# Patient Record
Sex: MALE | Race: WHITE | HISPANIC OR LATINO | Employment: FULL TIME | URBAN - METROPOLITAN AREA
[De-identification: names, ages, dates, MRNs, and addresses within clinical notes are randomized per-mention and may not be internally consistent; named-entity substitution may affect disease eponyms.]

---

## 2020-11-27 ENCOUNTER — CLINICAL SUPPORT (OUTPATIENT)
Dept: URGENT CARE | Facility: CLINIC | Age: 45
End: 2020-11-27
Payer: COMMERCIAL

## 2020-11-27 DIAGNOSIS — Z11.9 ENCOUNTER FOR SCREENING EXAMINATION FOR INFECTIOUS DISEASE: Primary | ICD-10-CM

## 2020-11-27 LAB
CTP QC/QA: YES
SARS-COV-2 RDRP RESP QL NAA+PROBE: NEGATIVE

## 2020-11-27 PROCEDURE — U0002: ICD-10-PCS | Mod: QW,S$GLB,, | Performed by: INTERNAL MEDICINE

## 2020-11-27 PROCEDURE — U0002 COVID-19 LAB TEST NON-CDC: HCPCS | Mod: QW,S$GLB,, | Performed by: INTERNAL MEDICINE

## 2020-11-27 NOTE — LETTER
[address] [facility] [address], [place] [address]  Phone: [phone]  Fax: [phone]          Return to Work/School    Patient: Anderson Bell  YOB: 1975   Date: 11/27/2020     To Whom It May Concern:     Anderson Bell was in contact with/seen in my office on 11/27/2020. COVID-19 is present in our communities across the state. There is limited testing for COVID at this time, so not all patients can be tested. In this situation, your employee meets the following criteria:     Anderson Bell has met the criteria for COVID-19 testing and has a NEGATIVE result. The employee can return to work once they are asymptomatic for 24 hours without the use of fever reducing medications (Tylenol, Motrin, etc).     If you have any questions or concerns, or if I can be of further assistance, please do not hesitate to contact me.     Sincerely,    NURSE URGENT CARE, [facility]

## 2020-11-27 NOTE — PATIENT INSTRUCTIONS
"Your test was NEGATIVE for COVID-19 (coronavirus).      You may leave home and/or return to work when the following conditions are met:   24 hours fever free without fever-reducing medications AND   Improved symptoms   You have not met the conditions of a closed exposure       A "close exposure" is defined as anyone who has had an exposure (masked or unmasked) to a known COVID -19 positive person within 6 ft for longer than 15 minutes. If your exposure meets this definition you are required by CDC guidelines to quarantine for 14 days from time of exposure regardless of test status.      Additional instructions:  · Social distance per your local guidelines  · Call ahead before visiting your doctor.  · Wear a facemask when around others who do not live in your household.  · Cover your coughs and sneezes.  · Wash your hands often with soap and water; hand  can be used, too.      If your symptoms worsen or if you have any other concerns, please contact [facility] at [phone].     Sincerely,    BRETT LEWIS RT    "

## 2024-03-06 ENCOUNTER — PATIENT MESSAGE (OUTPATIENT)
Dept: SPORTS MEDICINE | Facility: CLINIC | Age: 49
End: 2024-03-06

## 2024-03-06 ENCOUNTER — OFFICE VISIT (OUTPATIENT)
Dept: SPORTS MEDICINE | Facility: CLINIC | Age: 49
End: 2024-03-06
Payer: COMMERCIAL

## 2024-03-06 ENCOUNTER — TELEPHONE (OUTPATIENT)
Dept: SPORTS MEDICINE | Facility: CLINIC | Age: 49
End: 2024-03-06
Payer: COMMERCIAL

## 2024-03-06 VITALS
DIASTOLIC BLOOD PRESSURE: 81 MMHG | HEART RATE: 89 BPM | SYSTOLIC BLOOD PRESSURE: 124 MMHG | HEIGHT: 66 IN | BODY MASS INDEX: 37.61 KG/M2 | WEIGHT: 234 LBS

## 2024-03-06 DIAGNOSIS — M62.830 SPASM OF THORACIC BACK MUSCLE: Primary | ICD-10-CM

## 2024-03-06 PROCEDURE — 1159F MED LIST DOCD IN RCRD: CPT | Mod: CPTII,S$GLB,, | Performed by: PHYSICIAN ASSISTANT

## 2024-03-06 PROCEDURE — 3079F DIAST BP 80-89 MM HG: CPT | Mod: CPTII,S$GLB,, | Performed by: PHYSICIAN ASSISTANT

## 2024-03-06 PROCEDURE — 1160F RVW MEDS BY RX/DR IN RCRD: CPT | Mod: CPTII,S$GLB,, | Performed by: PHYSICIAN ASSISTANT

## 2024-03-06 PROCEDURE — 3074F SYST BP LT 130 MM HG: CPT | Mod: CPTII,S$GLB,, | Performed by: PHYSICIAN ASSISTANT

## 2024-03-06 PROCEDURE — 3008F BODY MASS INDEX DOCD: CPT | Mod: CPTII,S$GLB,, | Performed by: PHYSICIAN ASSISTANT

## 2024-03-06 PROCEDURE — 99203 OFFICE O/P NEW LOW 30 MIN: CPT | Mod: S$GLB,,, | Performed by: PHYSICIAN ASSISTANT

## 2024-03-06 PROCEDURE — 99999 PR PBB SHADOW E&M-EST. PATIENT-LVL III: CPT | Mod: PBBFAC,,, | Performed by: PHYSICIAN ASSISTANT

## 2024-03-06 RX ORDER — METHYLPREDNISOLONE 4 MG/1
TABLET ORAL
Qty: 21 EACH | Refills: 0 | Status: SHIPPED | OUTPATIENT
Start: 2024-03-06 | End: 2024-03-06

## 2024-03-06 RX ORDER — METHYLPREDNISOLONE 4 MG/1
TABLET ORAL
Qty: 21 EACH | Refills: 0 | Status: SHIPPED | OUTPATIENT
Start: 2024-03-06 | End: 2024-03-27

## 2024-03-06 RX ORDER — METHOCARBAMOL 1000 MG/1
1000 TABLET, COATED ORAL 3 TIMES DAILY PRN
Qty: 30 TABLET | Refills: 0 | Status: SHIPPED | OUTPATIENT
Start: 2024-03-06 | End: 2024-03-06

## 2024-03-06 RX ORDER — METHOCARBAMOL 500 MG/1
1000 TABLET, FILM COATED ORAL 3 TIMES DAILY PRN
Qty: 60 TABLET | Refills: 0 | Status: SHIPPED | OUTPATIENT
Start: 2024-03-06

## 2024-03-06 NOTE — PROGRESS NOTES
NEW PATIENT    HISTORY OF PRESENT ILLNESS   48 y.o. Male with a history of mid-back / right flank pain who began having discomfort 4 days ago.  He denies having any precipitating injury or trauma.  He has no pain with breathing, coughing, or sneezing.  He does not recall feeling a pop.  He has had some spasming of the musculature under his right shoulder blade.  He denies having any paresthesias.        - mechanical symptoms, - instability    Is affecting ADLs.  Pain is 10/10 at it's worst.        PAST MEDICAL HISTORY    History reviewed. No pertinent past medical history.    PAST SURGICAL HISTORY     History reviewed. No pertinent surgical history.    FAMILY HISTORY    History reviewed. No pertinent family history.    SOCIAL HISTORY    Social History     Socioeconomic History    Marital status:      Social Determinants of Health     Financial Resource Strain: Low Risk  (3/6/2024)    Overall Financial Resource Strain (CARDIA)     Difficulty of Paying Living Expenses: Not hard at all   Food Insecurity: Unknown (3/6/2024)    Hunger Vital Sign     Worried About Running Out of Food in the Last Year: Never true     Ran Out of Food in the Last Year: Patient declined   Transportation Needs: No Transportation Needs (3/6/2024)    PRAPARE - Transportation     Lack of Transportation (Medical): No     Lack of Transportation (Non-Medical): No   Physical Activity: Sufficiently Active (3/6/2024)    Exercise Vital Sign     Days of Exercise per Week: 5 days     Minutes of Exercise per Session: 50 min   Stress: No Stress Concern Present (3/6/2024)    [employer] - Occupational Stress Questionnaire     Feeling of Stress : Not at all   Social Connections: Unknown (3/6/2024)    Social Connection and Isolation Panel [NHANES]     Frequency of Communication with Friends and Family: Twice a week     Frequency of Social Gatherings with Friends and Family: Three times a week     Active Member of Clubs or  "Organizations: Yes     Attends Club or Organization Meetings: More than 4 times per year     Marital Status:    Housing Stability: High Risk (3/6/2024)    Housing Stability Vital Sign     Unable to Pay for Housing in the Last Year: Patient declined     Unstable Housing in the Last Year: Yes       MEDICATIONS      Current Outpatient Medications:     methocarbamoL (ROBAXIN) 500 MG Tab, Take 2 tablets (1,000 mg total) by mouth 3 (three) times daily as needed (Pain/Spasm)., Disp: 60 tablet, Rfl: 0    methylPREDNISolone (MEDROL DOSEPACK) 4 mg tablet, use as directed on package, Disp: 21 each, Rfl: 0    ALLERGIES     Review of patient's allergies indicates:  No Known Allergies      REVIEW OF SYSTEMS   Constitution: Negative. Negative for chills, fever and night sweats.   HENT: Negative for congestion and headaches.    Eyes: Negative for blurred vision, left vision loss and right vision loss.   Cardiovascular: Negative for chest pain and syncope.   Respiratory: Negative for cough and shortness of breath.    Endocrine: Negative for polydipsia, polyphagia and polyuria.   Hematologic/Lymphatic: Negative for bleeding problem. Does not bruise/bleed easily.   Skin: Negative for dry skin, itching and rash.   Musculoskeletal: Negative for falls. Positive for right flank pain.  Gastrointestinal: Negative for abdominal pain and bowel incontinence.   Genitourinary: Negative for bladder incontinence and nocturia.   Neurological: Negative for disturbances in coordination, loss of balance and seizures.   Psychiatric/Behavioral: Negative for depression. The patient does not have insomnia.    Allergic/Immunologic: Negative for hives and persistent infections.     PHYSICAL EXAMINATION    Vitals: /81   Pulse 89   Ht 5' 6" (1.676 m)   Wt 106.1 kg (234 lb 0.3 oz)   BMI 37.77 kg/m²     General: The patient appears active and healthy with no apparent physical problems.  No disturbance of mood or affect is demonstrated. Alert " and Oriented.    HEENT: Eyes normal, pupils equally round, nose normal.    Resp: Equal and symmetrical chest rises. No wheezing    CV: Regular rate    Neck: Supple; nonpainful range of motion.    Extremities: no cyanosis, clubbing, edema, or diffuse swelling.  Palpable pulses, good capillary refill of the digits.  No coolness, discoloration, edema or obvious varicosities.    Skin: no lesions noted.    Lymphatic: no detected adenopathy in the upper or lower extremities.    Neurologic: normal mental status, normal reflexes, normal gait and balance.  Patient is alert and oriented to person, place and time.  No flaccidity or spasticity is noted.  No motor or sensory deficits are noted.  Light touch is intact    Orthopaedic: SHOULDER EXAM - RIGHT    Inspection:   Normal skin color and appearance with no scars.  No muscle atrophy noted.  No scapular winging.      Palpation: No tenderness of the acromioclavicular joint, lateral edge of the acromion, biceps tendon, trapezius muscle or scapulothoracic bursa.      ROM:      PROM:     FE - 180°    Abd/ER -  90°  Abd/IR -  45°   Add/ER -  60°     AROM:    FE - 180°    Abd/ER -  90°  Abd/IR -  45°   Add/ER -  60°         Tests:     - Gonzalez, - Neer's, - Cross Arm Adduction, - [place], - [place]erguson, - Speed. - Belly Press,  - Jobes, - Lift Off    Stability: - sulcus, - apprehension, - relocation, - load and shift, - DLS      Motor:  Rotator cuff strength is 5/5 supraspinatus, 5/5 infraspinatus, 5/5 subscapularis. Biceps, triceps and deltoid strength is 5/5.      Neuro     Distally there are no paresthesias, and sensation is intact to light touch in the median, ulnar, and radial distributions.  Reflexes are 2/2 biceps, triceps and brachioradialis.          IMPRESSION       ICD-10-CM ICD-9-CM   1. Spasm of thoracic back muscle  M62.830 724.8       MEDICATIONS PRESCRIBED      Medrol dosepack  Robaxin 1,000 mg    RECOMMENDATIONS     History and physical examination are consistent with  an acute muscle spasm  Activity modifications were given  Encouraged rest from physical activities and the use of a heating pad as needed  Medrol Dosepak taken as directed  Robaxin 1000 mg by mouth 3 times daily as needed for pain/muscle spasms  Evaluation by physical therapist for modalities and massage  Return to clinic as needed      All questions were answered, pt will contact us for questions or concerns in the interim.

## 2024-03-06 NOTE — TELEPHONE ENCOUNTER
Left a message for patient to let him know that his appointment was schedule incorrectly and to please call [phone] to get schedule correctly,.

## 2024-03-15 ENCOUNTER — TELEPHONE (OUTPATIENT)
Dept: SPORTS MEDICINE | Facility: CLINIC | Age: 49
End: 2024-03-15
Payer: COMMERCIAL

## 2024-03-15 NOTE — TELEPHONE ENCOUNTER
Called and spoke with pharmacy to let them know that he already got his refill at [place] [facility] and to please disregard refill.    Rosa Elena,    ----- Message from Cris Baca sent at 3/15/2024  9:15 AM CDT -----  Regarding: Rx clarification  Contact: [phone]  Anderson Bell calling regarding Pharmacy Authorization (message) for # methocarbamoL (ROBAXIN) 500 MG Tab   Chefar from [facility] is calling to clarify the strength of the medication     I show on the medication that it is 500 but I cannot verify please call to advise asap.      [facility] [address] [facility] [address], [place] - [address]  [address]  [facility] [address]  Phone: [phone] Fax: [phone]

## 2024-06-17 ENCOUNTER — TELEPHONE (OUTPATIENT)
Dept: ORTHOPEDICS | Facility: CLINIC | Age: 49
End: 2024-06-17
Payer: COMMERCIAL

## 2024-06-17 NOTE — TELEPHONE ENCOUNTER
Got pt scheduled at [facility] appt at [place].----- Message from Abril Marie sent at 6/17/2024 12:30 PM CDT -----  Regarding: Call back  Contact: [phone]  Type:  Patient Returning Call    Who Called: PT   Who Left Message for Patient: Nurse   Does the patient know what this is regarding?: Yes   Would the patient rather a call back or a response via [facility] [employer]? Call back   Best Call Back Number: [phone]   Additional Information: schedule an appoinmnet

## 2024-07-18 ENCOUNTER — TELEPHONE (OUTPATIENT)
Dept: ORTHOPEDICS | Facility: CLINIC | Age: 49
End: 2024-07-18
Payer: COMMERCIAL

## 2024-07-18 NOTE — TELEPHONE ENCOUNTER
Hello, this is a message to notify you regarding your xray appointment at [place] Location - 1st floor check in [address]. 30 minutes prior to your appointment time on 7/22/24 with Dr. Melissa for x-rays.     Please arrive a little early to check in prior to your appointment approximately at 8:45AM.     Xray does run behind sometimes, we do appreciate your patience in advance. If you would like to get your xray before your appointment please reschedule your xray at a location near you at your convenience through the [facility].    *Please arrive at your informed time above, if you are more than 15 Minutes late to your appointment with Dr. Melissa we will have to reschedule your appointment. This will allow you to be seen in a timely manor and be conscious to other patients being seen that same day*     Please respond to this message to confirm you received this notification.

## 2024-07-22 ENCOUNTER — PATIENT MESSAGE (OUTPATIENT)
Dept: ORTHOPEDICS | Facility: CLINIC | Age: 49
End: 2024-07-22

## 2024-07-22 ENCOUNTER — HOSPITAL ENCOUNTER (OUTPATIENT)
Dept: RADIOLOGY | Facility: HOSPITAL | Age: 49
Discharge: HOME | End: 2024-07-22
Attending: ORTHOPAEDIC SURGERY
Payer: COMMERCIAL

## 2024-07-22 ENCOUNTER — OFFICE VISIT (OUTPATIENT)
Dept: ORTHOPEDICS | Facility: CLINIC | Age: 49
End: 2024-07-22
Payer: COMMERCIAL

## 2024-07-22 DIAGNOSIS — M65.842 STENOSING TENOSYNOVITIS OF FINGER OF LEFT HAND: Primary | ICD-10-CM

## 2024-07-22 DIAGNOSIS — M79.642 LEFT HAND PAIN: ICD-10-CM

## 2024-07-22 DIAGNOSIS — M79.645 FINGER PAIN, LEFT: ICD-10-CM

## 2024-07-22 DIAGNOSIS — M25.50 PAIN IN JOINT, MULTIPLE SITES: ICD-10-CM

## 2024-07-22 DIAGNOSIS — M79.642 LEFT HAND PAIN: Primary | ICD-10-CM

## 2024-07-22 PROCEDURE — 1160F RVW MEDS BY RX/DR IN RCRD: CPT | Mod: CPTII,S$GLB,, | Performed by: ORTHOPAEDIC SURGERY

## 2024-07-22 PROCEDURE — 73140 X-RAY EXAM OF FINGER(S): CPT | Mod: 26,LT,, | Performed by: RADIOLOGY

## 2024-07-22 PROCEDURE — 99999 PR PBB SHADOW E&M-EST. PATIENT-LVL III: CPT | Mod: PBBFAC,,, | Performed by: ORTHOPAEDIC SURGERY

## 2024-07-22 PROCEDURE — 1159F MED LIST DOCD IN RCRD: CPT | Mod: CPTII,S$GLB,, | Performed by: ORTHOPAEDIC SURGERY

## 2024-07-22 PROCEDURE — 73130 X-RAY EXAM OF HAND: CPT | Mod: 26,LT,, | Performed by: RADIOLOGY

## 2024-07-22 PROCEDURE — 99205 OFFICE O/P NEW HI 60 MIN: CPT | Mod: 57,S$GLB,, | Performed by: ORTHOPAEDIC SURGERY

## 2024-07-22 PROCEDURE — 73140 X-RAY EXAM OF FINGER(S): CPT | Mod: TC,LT

## 2024-07-22 PROCEDURE — 73130 X-RAY EXAM OF HAND: CPT | Mod: TC,LT

## 2024-07-22 RX ORDER — TADALAFIL 20 MG/1
20 TABLET, FILM COATED ORAL DAILY
COMMUNITY
Start: 2023-10-20

## 2024-07-22 NOTE — PATIENT INSTRUCTIONS
[facility]  HAND CLINIC SURGERY INSTRUCTIONS  Sophy Melissa MD  Date of Surgery: 8/15/24    Location of Surgery:      [x] [place], [address]    [address] [place] [address]    [] [facility]  [address]st [place] [address]  *[facility] - Pre Op Anesthesia Clinic: [phone]* You will need to contact this department to schedule a telephone or in person visit prior to your surgery date ONLY if you are having surgery at [facility].    [] [facility]  [address]nd [place] [address]    PRE-OPERATIVE INSTRUCTIONS:    Dr. Melissa's clinic staff will call you THE DAY BEFORE SURGERY with your arrival time. You will be notified in the afternoon between 3:00p-5:00pm. We will attempt to provide your arrival time earlier and will call you if this is at all possible.     DO NOT eat or drink after midnight, this includes gum/hard candy, coffee.   You may have ONLY SMALL sips of WATER the morning of surgery to take your medication, NOTHING ELSE.    You ARE NOT to drive or take an Uber/Lyft/taxi home from surgery. Please arrange a friend/family member to accompany you at the surgery center and drive you home.  Transportation: Wife [facility] Visitor Policy:    Each patient will be allowed 2 visitors with 1 visitor at a time. Only 1 swap out allowed.   Pediatric patients: Both parents are allowed if present.   Post-Op: If there is more than 1 visitor, the person that is the main caregiver will need to be available for d/c instructions should visit 2nd, and stay with the patient until d/c.  All visitors must be accompanied in and out with an employee.    PRE-OPERATIVE MEDICATION INSTRUCTIONS:    If you are diabetic, DO NOT take any diabetic medication the night before surgery or morning of surgery. If you are type I diabetic on an insulin pump, please bring your monitor the morning of  surgery.   NA    MUST HOLD SEMAGLUTIDE MEDICATIONS 7 DAYS PRIOR TO SURGERY, examples: Mounjaro, Ozempic, Trulicity.   NA    If you have high blood pressure please take your medication in the morning like normal with a SIP OF WATER; with the exception of any Angiotensin receptor blocker (end in sartan) and ACE inhibitors (end in pril).  NA    If you are taking blood thinners (Aspirin, Eliquis, Lovenox, Coumadin, etc), our office will contact the prescribing physician for guidance on when you are to stop/re-start your blood thinner medication.   NA    Other medications to dose with a SIP OF WATER AM of surgery:   NA    Other Important Medication Instructions:   NA    Please HOLD Vitamins 5 days prior to surgery or sooner, CONTINUE Vitamin D up until the AM of your surgery.    Avoid anti-inflammatory medications 5 days before your surgery. This includes Aleve/Naproxen, Celebrex, Meloxicam/Mobic, Voltaren/Diclofenac.   You may dose ibuprofen/Advil/Motrin up until the day before your surgery.  You may take extra strength Tylenol/Acetaminophen.    HOLD ALL OTHER MEDICATIONS AM OF SURGERY THAT ARE NOT DISCUSSED ABOVE        POST-OPERATIVE MEDICATION INSTRUCTIONS:    Your post-operative pain medication will be DELIVERED BEDSIDE to you at the surgery center by the [facility]. You DO NOT need to pick this medication up from the [facility].     TAKE post-operative pain medication as directed.   You may also take over-the-counter extra strength Tylenol/acetaminophen as directed on the bottle for breakthrough pain between dosed of prescribed pain medication.   Please note, some pain medications contain Tylenol/acetaminophen.   DO NOT exceed 3000mg of Tylenol/acetaminophen in 1 day.  You may also use an over-the-counter anti-inflammatory medication also known as an NSAID,  (Aleve/Naproxen) as directed on the bottle for breakthrough pain between doses of prescribed pain medication.  DO NOT take NSAIDs if you have been  previously instructed not to by a physician.     POST-OPERATIVE INSTRUCTIONS:    DO NOT let your operative area become wet, Your dressings/bandages/splints must remain dry.   Recommend waterproof arm cast cover to be worn when showering and bathing and can be purchased on Amazon. Garbage bags, plastic shopping bags, or umbrella bags can also be used instead.    DO NOT remove any post-operative dressings/bandages/splints until you are seen by Dr. Souza or Occupational Therapy   These dressings/bandages/splints are in place to keep the operative site clean, dry, and in optimal healing position     ELEVATE your hand/arm above the level of your heart as much as possible to decrease post-operative swelling for first 48 hours.  Swelling after surgery is TO BE EXPECTED.      ICE the operative site following surgery for 20-30 minutes, 4-5 times per day.  Be sure to have a towel between your dressings/bandages/splint to keep from getting wet.    MOVE the parts of your hand/arm that are not immobilized in a sling or splint.   Make a gentle fist with your fingers, bend/straighten your elbow, etc.   DO NOT attempt any strengthening exercises (hand putty, exercise balls, etc) on your operative side as this can increase swelling.     *CALL the [facility] at [phone]*  If at any time following surgery your dressings/bandages/splints: get wet, come loose, feels tight, feels uncomfortable.  Or if you are concerned about possible infection, worsening pain, worsening swelling or have any other concerns regarding your surgery.   Signs of infection:  fever (over 100.3), chills, body aches, increased warmth, redness, significant increase in swelling & pain at surgical site.     OUTPATIENT FOLLOW UP:  YOU WILL BE SEEN FIRST BY OCCUPATIONAL THERAPY 10-11 DAYS AFTER YOUR SURGERY. *Your splint / soft dressing will be removed as well as your sutures if applicable.  YOU WILL THEN BE SEEN BY DR. SOUZA IN CLINIC 3 WEEKS AFTER  SURGERY    FMLA or Disability paperwork can be sent to [facility]  *Only needed if you are going to be out of work 2 weeks or more*  (P) [phone] (F) [phone] or email [email or web address]

## 2024-07-22 NOTE — PROGRESS NOTES
[facility]  History & Physical  Orthopedics    SUBJECTIVE:      Chief Complaint:   Chief Complaint   Patient presents with    Left Hand - Pain, Swelling       Referring Provider: none    History of Present Illness:    Patient is a 48 y.o. right hand dominant male who presents today with complaints of Left ring trigger finger and PIP joint pain and stiffness.    Patient reports 2 previous steroid injections of his left ring flexor tendon sheath with the most recent being a year ago.  Patient states that the injections last about 4-5 months.  Once the injection wears off he has triggering, catching, locking on a daily basis.    He is also here today complaining of left ring PIP joint stiffness and pain 7/10.  Patient states that he has been dealing with this for many years.  Unsure of any specific injury, although he says he jammed his fingers quite often.  He has not tried a steroid injection for this pain, he does take over-the-counter anti-inflammatory.    He is considering surgery during the holidays due to his job as a . He leads an active lifestyle.  He underwent carpal tunnel surgery at the age of 22 and recovered well. He also underwent knee surgeries and was informed that he may have some arthritis, so he is concerned about his mother's history of arthritis.    Surgical history:  10/27/20 right carpal tunnel release Dr. Mariely Parra  History of Present Illness     His mother has rheumatoid arthritis.  PMHx no pertinent medical history  Vitals:    07/22/24 0909   PainSc:   7   PainLoc: Hand     The patient is a/an .  The patient denies any fevers, chills, N/V, D/C and presents for evaluation.    History reviewed. No pertinent past medical history.  History reviewed. No pertinent surgical history.  Review of patient's allergies indicates:  No Known Allergies  Social History     Social History Narrative    Not on file     No family history on  file.      Current Outpatient Medications:     CIALIS 20 mg Tab, Take 20 mg by mouth once daily., Disp: , Rfl:     ROS    Review of Systems:  Constitutional: no fever or chills  Eyes: no visual changes  ENT: no nasal congestion or sore throat  Respiratory: no cough or shortness of breath  Cardiovascular: no chest pain  Gastrointestinal: no nausea or vomiting, tolerating diet  Musculoskeletal: pain, soreness, and decreased ROM    OBJECTIVE:      Vital Signs (Most Recent):  There were no vitals filed for this visit.  There is no height or weight on file to calculate BMI.    Physical Exam    Physical Exam:  Constitutional: The patient appears well-developed and well-nourished. No distress.   Head: Normocephalic and atraumatic.   Nose: Nose normal.   Eyes: Conjunctivae and EOM are normal.   Neck: No tracheal deviation present.   Cardiovascular: Normal rate and intact distal pulses.    Pulmonary/Chest: Effort normal. No respiratory distress.   Abdominal: There is no guarding.   Lymphatic: Negative for adenopathy   Neurological: The patient is alert.   Psychiatric: The patient has a normal mood and affect.     Bilateral Hand/Wrist Examination:  Observation/Inspection:  Swelling  none    Deformity  none  Discoloration  none     Scars   none    Atrophy  none    HAND/WRIST EXAMINATION:    Left ring finger tender to palpation flexor sheath, flexor tenosynovitis, decreased range of motion left ring finger otherwise bilateral ROM hand/wrist/elbow full  Physical Exam       Neurovascular Exam:  Digits WWP, brisk CR < 3s throughout  NVI motor/LTS to M/R/U nerves, radial pulse 2+  2+ biceps and brachioradialis reflexes    Diagnostic studies and other clinical records review:  Results       X-rays AP, lateral and oblique left hand taken today are independently reviewed by me and shows Eaton stage II basilar thumb arthritis and left ring finger soft tissue swelling with calcifications around the joint.     ASSESSMENT/PLAN:    48 y.o.  yo male with   Encounter Diagnoses   Name Primary?    Pain in joint, multiple sites     Stenosing tenosynovitis of finger of left hand Yes      Assessment & Plan    The patient and I had a thorough discussion today. We discussed the working diagnosis as well as several other potential alternative diagnoses. Treatment options were discussed, both conservative and surgical. Conservative treatment options would include things such as activity modifications, workplace modifications, a period of rest, oral vs topical OTC and prescription anti-inflammatory medications, occupational therapy, splinting/bracing, immobilization, corticosteroid injections, and others. Surgical options were discussed as well.     We have discussed conservative vs. surgical treatment as well as risks, benefits and alternatives for left ring stenosing flexor tenosynovitis.  Conservative measures have been exhausted and he would like to proceed with surgery. Surgery would include left ring flexor tenosynovectomy. Consent signed today in clinic. Light use of the hand will be indicated for the first 4-6 weeks.     We have discussed risks of hand surgery which include but are not limited to blood clots in the legs that can travel to the lungs (pulmonary embolism). Pulmonary embolism can cause shortness of breath, chest pain, and even shock. Other risks include urinary tract infection, nausea and vomiting (usually related to pain medication), chronic pain, bleeding, nerve damage, blood vessel injury, scarring and infection of the hand which can require re-operation. Furthermore, the risks of anesthesia include potential heart, lung, kidney, and liver damage.  Informed consent was obtained.  The patient understands and would like to proceed with surgery in the near future.    The patient's pathophysiology was explained in detail with reference to x-rays, models, other visual aids as appropriate.  Treatment options were discussed in detail.  Questions  were invited and answered to the patient's satisfaction. I reviewed Primary care , and other specialty's notes to better coordinate patient's care.          Sophy Melissa MD    Please be aware that this note has been generated with the assistance of [employer] voice-to-text.  Please excuse any spelling or grammatical errors.    This note was generated with the assistance of ambient listening technology. Verbal consent was obtained by the patient and accompanying visitor(s) for the recording of patient appointment to facilitate this note. I attest to having reviewed and edited the generated note for accuracy, though some syntax or spelling errors may persist. Please contact the author of this note for any clarification.

## 2024-07-22 NOTE — H&P (VIEW-ONLY)
[facility]  History & Physical  Orthopedics    SUBJECTIVE:      Chief Complaint:   Chief Complaint   Patient presents with    Left Hand - Pain, Swelling       Referring Provider: none    History of Present Illness:    Patient is a 48 y.o. right hand dominant male who presents today with complaints of Left ring trigger finger and PIP joint pain and stiffness.    Patient reports 2 previous steroid injections of his left ring flexor tendon sheath with the most recent being a year ago.  Patient states that the injections last about 4-5 months.  Once the injection wears off he has triggering, catching, locking on a daily basis.    He is also here today complaining of left ring PIP joint stiffness and pain 7/10.  Patient states that he has been dealing with this for many years.  Unsure of any specific injury, although he says he jammed his fingers quite often.  He has not tried a steroid injection for this pain, he does take over-the-counter anti-inflammatory.    He is considering surgery during the holidays due to his job as a . He leads an active lifestyle.  He underwent carpal tunnel surgery at the age of 22 and recovered well. He also underwent knee surgeries and was informed that he may have some arthritis, so he is concerned about his mother's history of arthritis.    Surgical history:  10/27/20 right carpal tunnel release Dr. Mariely Parra  History of Present Illness     His mother has rheumatoid arthritis.  PMHx no pertinent medical history  Vitals:    07/22/24 0909   PainSc:   7   PainLoc: Hand     The patient is a/an .  The patient denies any fevers, chills, N/V, D/C and presents for evaluation.    History reviewed. No pertinent past medical history.  History reviewed. No pertinent surgical history.  Review of patient's allergies indicates:  No Known Allergies  Social History     Social History Narrative    Not on file     No family history on  file.      Current Outpatient Medications:     CIALIS 20 mg Tab, Take 20 mg by mouth once daily., Disp: , Rfl:     ROS    Review of Systems:  Constitutional: no fever or chills  Eyes: no visual changes  ENT: no nasal congestion or sore throat  Respiratory: no cough or shortness of breath  Cardiovascular: no chest pain  Gastrointestinal: no nausea or vomiting, tolerating diet  Musculoskeletal: pain, soreness, and decreased ROM    OBJECTIVE:      Vital Signs (Most Recent):  There were no vitals filed for this visit.  There is no height or weight on file to calculate BMI.    Physical Exam    Physical Exam:  Constitutional: The patient appears well-developed and well-nourished. No distress.   Head: Normocephalic and atraumatic.   Nose: Nose normal.   Eyes: Conjunctivae and EOM are normal.   Neck: No tracheal deviation present.   Cardiovascular: Normal rate and intact distal pulses.    Pulmonary/Chest: Effort normal. No respiratory distress.   Abdominal: There is no guarding.   Lymphatic: Negative for adenopathy   Neurological: The patient is alert.   Psychiatric: The patient has a normal mood and affect.     Bilateral Hand/Wrist Examination:  Observation/Inspection:  Swelling  none    Deformity  none  Discoloration  none     Scars   none    Atrophy  none    HAND/WRIST EXAMINATION:    Left ring finger tender to palpation flexor sheath, flexor tenosynovitis, decreased range of motion left ring finger otherwise bilateral ROM hand/wrist/elbow full  Physical Exam       Neurovascular Exam:  Digits WWP, brisk CR < 3s throughout  NVI motor/LTS to M/R/U nerves, radial pulse 2+  2+ biceps and brachioradialis reflexes    Diagnostic studies and other clinical records review:  Results       X-rays AP, lateral and oblique left hand taken today are independently reviewed by me and shows Eaton stage II basilar thumb arthritis and left ring finger soft tissue swelling with calcifications around the joint.     ASSESSMENT/PLAN:    48 y.o.  yo male with   Encounter Diagnoses   Name Primary?    Pain in joint, multiple sites     Stenosing tenosynovitis of finger of left hand Yes      Assessment & Plan    The patient and I had a thorough discussion today. We discussed the working diagnosis as well as several other potential alternative diagnoses. Treatment options were discussed, both conservative and surgical. Conservative treatment options would include things such as activity modifications, workplace modifications, a period of rest, oral vs topical OTC and prescription anti-inflammatory medications, occupational therapy, splinting/bracing, immobilization, corticosteroid injections, and others. Surgical options were discussed as well.     We have discussed conservative vs. surgical treatment as well as risks, benefits and alternatives for left ring stenosing flexor tenosynovitis.  Conservative measures have been exhausted and he would like to proceed with surgery. Surgery would include left ring flexor tenosynovectomy. Consent signed today in clinic. Light use of the hand will be indicated for the first 4-6 weeks.     We have discussed risks of hand surgery which include but are not limited to blood clots in the legs that can travel to the lungs (pulmonary embolism). Pulmonary embolism can cause shortness of breath, chest pain, and even shock. Other risks include urinary tract infection, nausea and vomiting (usually related to pain medication), chronic pain, bleeding, nerve damage, blood vessel injury, scarring and infection of the hand which can require re-operation. Furthermore, the risks of anesthesia include potential heart, lung, kidney, and liver damage.  Informed consent was obtained.  The patient understands and would like to proceed with surgery in the near future.    The patient's pathophysiology was explained in detail with reference to x-rays, models, other visual aids as appropriate.  Treatment options were discussed in detail.  Questions  were invited and answered to the patient's satisfaction. I reviewed Primary care , and other specialty's notes to better coordinate patient's care.          Sophy Melissa MD    Please be aware that this note has been generated with the assistance of [employer] voice-to-text.  Please excuse any spelling or grammatical errors.    This note was generated with the assistance of ambient listening technology. Verbal consent was obtained by the patient and accompanying visitor(s) for the recording of patient appointment to facilitate this note. I attest to having reviewed and edited the generated note for accuracy, though some syntax or spelling errors may persist. Please contact the author of this note for any clarification.

## 2024-07-26 ENCOUNTER — LAB VISIT (OUTPATIENT)
Dept: LAB | Facility: HOSPITAL | Age: 49
End: 2024-07-26
Attending: ORTHOPAEDIC SURGERY
Payer: COMMERCIAL

## 2024-07-26 DIAGNOSIS — M25.50 PAIN IN JOINT, MULTIPLE SITES: ICD-10-CM

## 2024-07-26 LAB
BASOPHILS # BLD AUTO: 0.05 K/UL (ref 0–0.2)
BASOPHILS NFR BLD: 0.9 % (ref 0–1.9)
CCP AB SER IA-ACNC: 0.7 U/ML
CRP SERPL-MCNC: 0.6 MG/L (ref 0–8.2)
DIFFERENTIAL METHOD BLD: NORMAL
EOSINOPHIL # BLD AUTO: 0.2 K/UL (ref 0–0.5)
EOSINOPHIL NFR BLD: 4.4 % (ref 0–8)
ERYTHROCYTE [DISTWIDTH] IN BLOOD BY AUTOMATED COUNT: 12.8 % (ref 11.5–14.5)
ERYTHROCYTE [SEDIMENTATION RATE] IN BLOOD BY PHOTOMETRIC METHOD: 11 MM/HR (ref 0–23)
HCT VFR BLD AUTO: 48.1 % (ref 40–54)
HGB BLD-MCNC: 16.6 G/DL (ref 14–18)
IMM GRANULOCYTES # BLD AUTO: 0.01 K/UL (ref 0–0.04)
IMM GRANULOCYTES NFR BLD AUTO: 0.2 % (ref 0–0.5)
LYMPHOCYTES # BLD AUTO: 2 K/UL (ref 1–4.8)
LYMPHOCYTES NFR BLD: 37.4 % (ref 18–48)
MCH RBC QN AUTO: 30.4 PG (ref 27–31)
MCHC RBC AUTO-ENTMCNC: 34.5 G/DL (ref 32–36)
MCV RBC AUTO: 88 FL (ref 82–98)
MONOCYTES # BLD AUTO: 0.5 K/UL (ref 0.3–1)
MONOCYTES NFR BLD: 9 % (ref 4–15)
NEUTROPHILS # BLD AUTO: 2.6 K/UL (ref 1.8–7.7)
NEUTROPHILS NFR BLD: 48.1 % (ref 38–73)
NRBC BLD-RTO: 0 /100 WBC
PLATELET # BLD AUTO: 166 K/UL (ref 150–450)
PMV BLD AUTO: 10.3 FL (ref 9.2–12.9)
RBC # BLD AUTO: 5.46 M/UL (ref 4.6–6.2)
RHEUMATOID FACT SERPL-ACNC: <13 IU/ML (ref 0–15)
WBC # BLD AUTO: 5.43 K/UL (ref 3.9–12.7)

## 2024-07-26 PROCEDURE — 86038 ANTINUCLEAR ANTIBODIES: CPT | Performed by: ORTHOPAEDIC SURGERY

## 2024-07-26 PROCEDURE — 86200 CCP ANTIBODY: CPT | Performed by: ORTHOPAEDIC SURGERY

## 2024-07-26 PROCEDURE — 85652 RBC SED RATE AUTOMATED: CPT | Performed by: ORTHOPAEDIC SURGERY

## 2024-07-26 PROCEDURE — 86431 RHEUMATOID FACTOR QUANT: CPT | Performed by: ORTHOPAEDIC SURGERY

## 2024-07-26 PROCEDURE — 85025 COMPLETE CBC W/AUTO DIFF WBC: CPT | Performed by: ORTHOPAEDIC SURGERY

## 2024-07-26 PROCEDURE — 81374 HLA I TYPING 1 ANTIGEN LR: CPT | Performed by: ORTHOPAEDIC SURGERY

## 2024-07-26 PROCEDURE — 86140 C-REACTIVE PROTEIN: CPT | Performed by: ORTHOPAEDIC SURGERY

## 2024-07-26 PROCEDURE — 36415 COLL VENOUS BLD VENIPUNCTURE: CPT | Performed by: ORTHOPAEDIC SURGERY

## 2024-07-29 LAB — ANA SER QL IF: NORMAL

## 2024-08-05 ENCOUNTER — ANESTHESIA EVENT (OUTPATIENT)
Dept: SURGERY | Facility: HOSPITAL | Age: 49
End: 2024-08-05
Payer: COMMERCIAL

## 2024-08-14 ENCOUNTER — PATIENT MESSAGE (OUTPATIENT)
Dept: ORTHOPEDICS | Facility: CLINIC | Age: 49
End: 2024-08-14
Payer: COMMERCIAL

## 2024-08-14 ENCOUNTER — TELEPHONE (OUTPATIENT)
Dept: ORTHOPEDICS | Facility: CLINIC | Age: 49
End: 2024-08-14
Payer: COMMERCIAL

## 2024-08-14 DIAGNOSIS — M65.842 STENOSING TENOSYNOVITIS OF FINGER OF LEFT HAND: Primary | ICD-10-CM

## 2024-08-14 PROBLEM — M65.90 TENOSYNOVITIS: Status: ACTIVE | Noted: 2024-08-14

## 2024-08-14 PROBLEM — M65.9 TENOSYNOVITIS: Status: ACTIVE | Noted: 2024-08-14

## 2024-08-14 RX ORDER — ONDANSETRON HYDROCHLORIDE 8 MG/1
8 TABLET, FILM COATED ORAL EVERY 8 HOURS PRN
Qty: 25 TABLET | Refills: 0 | Status: SHIPPED | OUTPATIENT
Start: 2024-08-14

## 2024-08-14 RX ORDER — TRAMADOL HYDROCHLORIDE 50 MG/1
50 TABLET ORAL EVERY 6 HOURS
Qty: 25 TABLET | Refills: 0 | Status: SHIPPED | OUTPATIENT
Start: 2024-08-14

## 2024-08-14 NOTE — TELEPHONE ENCOUNTER
Spoke to patient to inform them of their surgery arrival time (6 am), SANDHYA, [address] [address]:  Verbalized understanding of instructions for pre-wash, and reviewed NPO after midnight.   Confirmed call in regards to medication instructions from anesthesia.   Confirmed patient was NOT using a rideshare service for surgery arrival or  transportation.   wife  Confirmed no new wounds or abrasions on operative extremity.      [place] Pre-Op and PACU Visiting Policy  · Each patient will be allowed 2 visitors with 1 visitor at a time. Only 1 swap out allowed.  · Pediatric patients: Both parents are allowed if present.  · Post-Op: If there is more than 1 visitor, the person that is the main caregiver will need to be available for d/c instructions should visit 2nd, and stay with the patient until d/c.  All visitors must be accompanied in and out with an employee.      PRE-OPERATIVE MEDICATION INSTRUCTIONS:     If you are diabetic, DO NOT take any diabetic medication the night before surgery or morning of surgery. If you are type I diabetic on an insulin pump, please bring your monitor the morning of surgery.   NA     MUST HOLD SEMAGLUTIDE MEDICATIONS 7 DAYS PRIOR TO SURGERY, examples: Mounjaro, Ozempic, Trulicity.   NA     If you have high blood pressure please take your medication in the morning like normal with a SIP OF WATER; with the exception of any Angiotensin receptor blocker (end in sartan) and ACE inhibitors (end in pril).  NA     If you are taking blood thinners (Aspirin, Eliquis, Lovenox, Coumadin, etc), our office will contact the prescribing physician for guidance on when you are to stop/re-start your blood thinner medication.   NA     Other medications to dose with a SIP OF WATER AM of surgery:   NA     Other Important Medication Instructions:   NA     Please HOLD Vitamins 5 days prior to surgery or sooner, CONTINUE Vitamin D up until the AM of your surgery.     Avoid  anti-inflammatory medications 5 days before your surgery. This includes Aleve/Naproxen, Celebrex, Meloxicam/Mobic, Voltaren/Diclofenac.   You may dose ibuprofen/Advil/Motrin up until the day before your surgery.  You may take extra strength Tylenol/Acetaminophen.     HOLD ALL OTHER MEDICATIONS AM OF SURGERY THAT ARE NOT DISCUSSED ABOVE

## 2024-08-15 ENCOUNTER — HOSPITAL ENCOUNTER (OUTPATIENT)
Facility: HOSPITAL | Age: 49
Discharge: HOME | End: 2024-08-15
Attending: ORTHOPAEDIC SURGERY | Admitting: ORTHOPAEDIC SURGERY
Payer: COMMERCIAL

## 2024-08-15 ENCOUNTER — ANESTHESIA (OUTPATIENT)
Dept: SURGERY | Facility: HOSPITAL | Age: 49
End: 2024-08-15
Payer: COMMERCIAL

## 2024-08-15 VITALS
RESPIRATION RATE: 13 BRPM | HEART RATE: 74 BPM | DIASTOLIC BLOOD PRESSURE: 60 MMHG | SYSTOLIC BLOOD PRESSURE: 98 MMHG | TEMPERATURE: 98 F | WEIGHT: 226 LBS | BODY MASS INDEX: 36.32 KG/M2 | OXYGEN SATURATION: 95 % | HEIGHT: 66 IN

## 2024-08-15 DIAGNOSIS — M65.9 TENOSYNOVITIS: Primary | ICD-10-CM

## 2024-08-15 PROCEDURE — 25000003 PHARM REV CODE 250: Performed by: STUDENT IN AN ORGANIZED HEALTH CARE EDUCATION/TRAINING PROGRAM

## 2024-08-15 PROCEDURE — 26145 TENDON EXCISION PALM/FINGER: CPT | Mod: LT,,, | Performed by: ORTHOPAEDIC SURGERY

## 2024-08-15 PROCEDURE — 25000003 PHARM REV CODE 250: Performed by: ORTHOPAEDIC SURGERY

## 2024-08-15 PROCEDURE — 37000009 HC ANESTHESIA EA ADD 15 MINS: Performed by: ORTHOPAEDIC SURGERY

## 2024-08-15 PROCEDURE — 71000033 HC RECOVERY, INTIAL HOUR: Performed by: ORTHOPAEDIC SURGERY

## 2024-08-15 PROCEDURE — 99900035 HC TECH TIME PER 15 MIN (STAT)

## 2024-08-15 PROCEDURE — 25000003 PHARM REV CODE 250: Performed by: PHYSICIAN ASSISTANT

## 2024-08-15 PROCEDURE — 71000015 HC POSTOP RECOV 1ST HR: Performed by: ORTHOPAEDIC SURGERY

## 2024-08-15 PROCEDURE — 63600175 PHARM REV CODE 636 W HCPCS: Mod: JZ,JG | Performed by: ORTHOPAEDIC SURGERY

## 2024-08-15 PROCEDURE — 94761 N-INVAS EAR/PLS OXIMETRY MLT: CPT

## 2024-08-15 PROCEDURE — 36000706: Performed by: ORTHOPAEDIC SURGERY

## 2024-08-15 PROCEDURE — 25000003 PHARM REV CODE 250: Performed by: NURSE ANESTHETIST, CERTIFIED REGISTERED

## 2024-08-15 PROCEDURE — 37000008 HC ANESTHESIA 1ST 15 MINUTES: Performed by: ORTHOPAEDIC SURGERY

## 2024-08-15 PROCEDURE — 63600175 PHARM REV CODE 636 W HCPCS: Performed by: NURSE ANESTHETIST, CERTIFIED REGISTERED

## 2024-08-15 PROCEDURE — 36000707: Performed by: ORTHOPAEDIC SURGERY

## 2024-08-15 RX ORDER — HALOPERIDOL 5 MG/ML
0.5 INJECTION INTRAMUSCULAR EVERY 10 MIN PRN
Status: DISCONTINUED | OUTPATIENT
Start: 2024-08-15 | End: 2024-08-15 | Stop reason: HOSPADM

## 2024-08-15 RX ORDER — ONDANSETRON HYDROCHLORIDE 2 MG/ML
INJECTION, SOLUTION INTRAVENOUS
Status: DISCONTINUED | OUTPATIENT
Start: 2024-08-15 | End: 2024-08-15

## 2024-08-15 RX ORDER — CELECOXIB 200 MG/1
400 CAPSULE ORAL
Status: COMPLETED | OUTPATIENT
Start: 2024-08-15 | End: 2024-08-15

## 2024-08-15 RX ORDER — ACETAMINOPHEN 500 MG
1000 TABLET ORAL
Status: COMPLETED | OUTPATIENT
Start: 2024-08-15 | End: 2024-08-15

## 2024-08-15 RX ORDER — GLUCAGON 1 MG
1 KIT INJECTION
Status: DISCONTINUED | OUTPATIENT
Start: 2024-08-15 | End: 2024-08-15 | Stop reason: HOSPADM

## 2024-08-15 RX ORDER — MIDAZOLAM HYDROCHLORIDE 1 MG/ML
INJECTION INTRAMUSCULAR; INTRAVENOUS
Status: DISCONTINUED | OUTPATIENT
Start: 2024-08-15 | End: 2024-08-15

## 2024-08-15 RX ORDER — MUPIROCIN 20 MG/G
OINTMENT TOPICAL 2 TIMES DAILY
Status: DISCONTINUED | OUTPATIENT
Start: 2024-08-15 | End: 2024-08-15 | Stop reason: HOSPADM

## 2024-08-15 RX ORDER — ONDANSETRON HYDROCHLORIDE 2 MG/ML
4 INJECTION, SOLUTION INTRAVENOUS DAILY PRN
Status: DISCONTINUED | OUTPATIENT
Start: 2024-08-15 | End: 2024-08-15 | Stop reason: HOSPADM

## 2024-08-15 RX ORDER — BACITRACIN ZINC 500 [USP'U]/G
OINTMENT TOPICAL
Status: DISCONTINUED | OUTPATIENT
Start: 2024-08-15 | End: 2024-08-15 | Stop reason: HOSPADM

## 2024-08-15 RX ORDER — SODIUM CHLORIDE 9 MG/ML
INJECTION, SOLUTION INTRAVENOUS CONTINUOUS
Status: DISCONTINUED | OUTPATIENT
Start: 2024-08-15 | End: 2024-08-15 | Stop reason: HOSPADM

## 2024-08-15 RX ORDER — PROPOFOL 10 MG/ML
VIAL (ML) INTRAVENOUS CONTINUOUS PRN
Status: DISCONTINUED | OUTPATIENT
Start: 2024-08-15 | End: 2024-08-15

## 2024-08-15 RX ORDER — DEXMEDETOMIDINE HYDROCHLORIDE 100 UG/ML
INJECTION, SOLUTION INTRAVENOUS
Status: DISCONTINUED | OUTPATIENT
Start: 2024-08-15 | End: 2024-08-15

## 2024-08-15 RX ORDER — LIDOCAINE HYDROCHLORIDE 20 MG/ML
INJECTION INTRAVENOUS
Status: DISCONTINUED | OUTPATIENT
Start: 2024-08-15 | End: 2024-08-15

## 2024-08-15 RX ORDER — HYDROCODONE BITARTRATE AND ACETAMINOPHEN 5; 325 MG/1; MG/1
1 TABLET ORAL EVERY 4 HOURS PRN
Status: DISCONTINUED | OUTPATIENT
Start: 2024-08-15 | End: 2024-08-15 | Stop reason: HOSPADM

## 2024-08-15 RX ORDER — FAMOTIDINE 10 MG/ML
INJECTION INTRAVENOUS
Status: DISCONTINUED | OUTPATIENT
Start: 2024-08-15 | End: 2024-08-15

## 2024-08-15 RX ORDER — DEXAMETHASONE SODIUM PHOSPHATE 4 MG/ML
INJECTION, SOLUTION INTRA-ARTICULAR; INTRALESIONAL; INTRAMUSCULAR; INTRAVENOUS; SOFT TISSUE
Status: DISCONTINUED | OUTPATIENT
Start: 2024-08-15 | End: 2024-08-15

## 2024-08-15 RX ORDER — MUPIROCIN 20 MG/G
OINTMENT TOPICAL
Status: DISCONTINUED | OUTPATIENT
Start: 2024-08-15 | End: 2024-08-15 | Stop reason: HOSPADM

## 2024-08-15 RX ORDER — LIDOCAINE HYDROCHLORIDE 10 MG/ML
INJECTION, SOLUTION EPIDURAL; INFILTRATION; INTRACAUDAL; PERINEURAL
Status: DISCONTINUED | OUTPATIENT
Start: 2024-08-15 | End: 2024-08-15 | Stop reason: HOSPADM

## 2024-08-15 RX ORDER — SODIUM CHLORIDE 0.9 % (FLUSH) 0.9 %
10 SYRINGE (ML) INJECTION
Status: DISCONTINUED | OUTPATIENT
Start: 2024-08-15 | End: 2024-08-15 | Stop reason: HOSPADM

## 2024-08-15 RX ORDER — CEFAZOLIN 2 G/1
INJECTION, POWDER, FOR SOLUTION INTRAMUSCULAR; INTRAVENOUS
Status: DISCONTINUED | OUTPATIENT
Start: 2024-08-15 | End: 2024-08-15

## 2024-08-15 RX ORDER — METHOCARBAMOL 500 MG/1
1000 TABLET, FILM COATED ORAL ONCE
Status: DISCONTINUED | OUTPATIENT
Start: 2024-08-15 | End: 2024-08-15 | Stop reason: HOSPADM

## 2024-08-15 RX ORDER — BUPIVACAINE HYDROCHLORIDE 2.5 MG/ML
INJECTION, SOLUTION EPIDURAL; INFILTRATION; INTRACAUDAL
Status: DISCONTINUED | OUTPATIENT
Start: 2024-08-15 | End: 2024-08-15 | Stop reason: HOSPADM

## 2024-08-15 RX ADMIN — ACETAMINOPHEN 1000 MG: 500 TABLET ORAL at 06:08

## 2024-08-15 RX ADMIN — SODIUM CHLORIDE: 0.9 INJECTION, SOLUTION INTRAVENOUS at 08:08

## 2024-08-15 RX ADMIN — DEXAMETHASONE SODIUM PHOSPHATE 8 MG: 4 INJECTION, SOLUTION INTRAMUSCULAR; INTRAVENOUS at 08:08

## 2024-08-15 RX ADMIN — FAMOTIDINE 20 MG: 10 INJECTION, SOLUTION INTRAVENOUS at 08:08

## 2024-08-15 RX ADMIN — ONDANSETRON 4 MG: 2 INJECTION INTRAMUSCULAR; INTRAVENOUS at 08:08

## 2024-08-15 RX ADMIN — LIDOCAINE HYDROCHLORIDE 100 MG: 20 INJECTION INTRAVENOUS at 08:08

## 2024-08-15 RX ADMIN — SODIUM CHLORIDE: 0.9 INJECTION, SOLUTION INTRAVENOUS at 06:08

## 2024-08-15 RX ADMIN — DEXMEDETOMIDINE 8 MCG: 100 INJECTION, SOLUTION, CONCENTRATE INTRAVENOUS at 08:08

## 2024-08-15 RX ADMIN — CEFAZOLIN 2 G: 2 INJECTION, POWDER, FOR SOLUTION INTRAMUSCULAR; INTRAVENOUS at 08:08

## 2024-08-15 RX ADMIN — MIDAZOLAM HYDROCHLORIDE 2 MG: 2 INJECTION, SOLUTION INTRAMUSCULAR; INTRAVENOUS at 08:08

## 2024-08-15 RX ADMIN — PROPOFOL 100 MCG/KG/MIN: 10 INJECTION, EMULSION INTRAVENOUS at 08:08

## 2024-08-15 RX ADMIN — CELECOXIB 400 MG: 200 CAPSULE ORAL at 06:08

## 2024-08-15 RX ADMIN — MUPIROCIN: 20 OINTMENT TOPICAL at 06:08

## 2024-08-15 NOTE — OP NOTE
[facility] ([facility])  Surgery Department  Operative Note    SUMMARY     Date of Procedure: 8/15/2024     Procedure:    1. Flexor tenosynovectomy left ring finger, cpt 73126    Surgeons and Role:     * Sophy Melissa MD - Primary    Assisting Surgeon: Dakota Ko MD    [facility] Assistant: Eugene Paz    Pre-Operative Diagnosis: Stenosing tenosynovitis of finger of left hand [M65.842]    Post-Operative Diagnosis: Post-Op Diagnosis Codes:     * Stenosing tenosynovitis of finger of left hand [M65.842]    Anesthesia: Local MAC    Indication for Procedure:  47 yo male with longstanding left ring trigger finger that had failed conservative management.  Risks and benefits of the procedure were discussed with the patient and informed consent was obtained.    Description of the Findings of the Procedure: The patient was seen in the preoperative holding area and the left hand was marked. The patient was taken to the OR, placed supine on the table, and a padded hand table was used. After monitored anesthesia care was administered without difficulty, a time-out procedure was performed identifying the patient, the operative site and the procedure to be performed. A tourniquet was placed on the arm and the upper extremity was prepped and draped in standard sterile fashion. The upper extremity was elevated and exsanguinated with an Esmarch bandage, and the tourniquet was inflated to 250 mm Hg. 10cc of 1% lidocaine plain and 0.25% bupivacaine was used for a local block of the fingers. A 1 cm incision was made over the A1 pulley of the left ring finger. Littler scissors were used to dissect down to the flexor sheath. The A1 pulley was sharply incised, and released both proximally and distally. The A2 pulley was protected. There was a moderate amount of flexor tenosynovitis on the flexor tendon, this was sharply excised with Littler scissors. The neurovascular bundles were identified and protected throughout the case. The  finger were put through a range of motion and there was no longer any catching and locking. The wound was then irrigated with normal saline using a bulb syringe. 3-0 prolene was used to close the skin in a horizontal mattress fashion.  All instrument, sponge and needle counts were correct. Adaptic, 4x4, cast padding and coban were used to dress the hand. The tourniquet was deflated and the hand and fingers were pink and well-perfused.  The patient tolerated the procedure well.  He was taken awake, alert and in good condition to the recovery room.    Complications: No    Estimated Blood Loss (EBL): minimal           Specimens: none           Condition: Good    Disposition: PACU - hemodynamically stable.    Attestation: I was present and scrubbed for the entire procedure.

## 2024-08-15 NOTE — DISCHARGE INSTRUCTIONS
HAND SURGERY - Care of the Hand after Surgery       Post-Op Care  It is important to follow your orthopaedic surgeon's instructions carefully after you return home. You should ask   someone to check on you that evening. The protocols described here are general in nature. Every person and   every surgery is different so the information given here is for guidance only. If you have questions you should   contact us.     Day of Surgery    You were place in a soft dressing with coban today to protect the surgical area during healing. Do not remove the soft dressing with coban until you are seen by Occupational Therapy or Dr. Melissa for your first postop visit    The hand and fingers may be numb for quite some time after surgery. This is due to the anesthetic block used at the time of surgery for pain control.   If you have an arm sling you may remove the sling at your convenience once you regain control of your arm from the anesthetic block.     Begin taking liquids and food as soon as you can. You should always eat some solid food, a sandwich or light meal, a little while before taking your pain meds.     Day 3  Things are much the same on the third day after your surgery. Usually you have less pain and feel like doing more.    Showering: It is important to keep the incision absolutely dry while showering or bathing (use two plastic bags over your hand) or a shower bag.   If you feel that the pain medication you were given after surgery is stronger than you really need you can reduce the dose, take it less frequently or switch to ibuprofen or Tylenol. If you received antibiotics they should be taken until the entire prescription is completed.    Day 10-11  Occupational Therapy will see you between this time. Please let us know if you are not scheduled [phone]    Week 3  We will see you back after your surgery to review with you what was done in surgery and will talk about rehab and answer any questions you may have.        HAND SURGERY  Driving: You can drive if you are comfortable and have regained full finger movements and if you have sufficient power to control the vehicle.   Return to work: Timing of your return to work is variable according to your occupation and specific surgery. We should discuss this at your follow up visit if not already discussed prior.  Elevation: Hand elevation is important to prevent swelling and stiffness of the fingers. One minute of leaving your hand dangling negates four hours of keeping it elevated. Please remember not to walk with your hand hanging down or to sit with your hand resting in your lap. It is fine, however, to lower your hand for light use and you should get back to normal light activities as soon as possible as guided by common sense.     Post-operative exercises  [x] Bend your fingers  Relax your hand. Start with your fingers straight and close together. Bend the end and middle joints of your fingers. Keep your wrist and knuckles straight. Moving slowly and smoothly, return your hand to the starting position. Repeat with your other hand. If you can, perform multiple repetitions of this exercise on each hand.    [x] Open your hand wide                       Spread your fingers apart as wide as you can and hold that position. Slowly relax your fingers and bring them together. Return to the open-wide position. Repeat with each hand and gradually add to the number of repetitions.    [x] Make a fist  Start with your fingers straight and spread apart. Make a loose, gentle fist and wrap your thumb around the outside of your fingers. Be careful not to squeeze your fingers together too tightly. Moving slowly and smoothly, return to the starting position. Repeat. Perform this exercise on both hands.    [x] Touch your fingertips  Straighten your fingers and thumb. Bend your thumb across your palm, touching the tip of your thumb to the pad of your hand just below your pinky finger. If you  "can't make your thumb touch, just stretch as far as you can. Return your thumb to its starting position, as shown in images 1 through 3.  For the next exercise, form the letter O by touching your thumb to each fingertip, as shown in images 4 through 6. Moving slowly and smoothly, touch your index finger to your thumb, then straighten your fingers. Touch your middle finger to your thumb and straighten. Follow with your ring and pinky fingers.    [x] Walk your fingers  Rest your hand on a flat surface, such as a tabletop, with your palm facing down and your fingers spread slightly apart. Moving one finger at a time, slowly walk your fingers toward your thumb. Start by lifting and moving your index finger toward your thumb. Follow by lifting and moving your middle finger toward your thumb. Proceed with moving your ring finger and then your pinky finger toward your thumb. Don't move your wrist or thumb while doing this exercise. Repeat with your other hand.      HAND SURGERY - Common Concerns and Frequently Asked Questions    When to Call the Doctor  Pain, burning, or numbness of the fingers or the back of the hand not relieved by elevation of the arm  Pale or cold finger; bluish nail beds  Red line or streak going up the arm  Excessive swelling  Fever over 100.3ºF  Pain unrelieved by pain medication    Tips for one armed living  It helps to have...   In the shower   Plastic bags and rubber bands to cover bandages - the bags that newspapers come in are good to cover the hand and wrist. Otherwise small trash can liners will do. Use two at a time.   Bottle sponge (soft sponge on a long stick) - for the armpit of your "good" hand.   Shower brush   A hair brush in the shower will help you to wash your hair.   Cotton selene cloth bathrobe - to dry your back.    In the bathroom   Toothpaste, shampoo, etc. in flip-top or pump (not screw top) dispensers.   Consider an electric razor.   Flossers (dental floss on a "Y" shaped " "handle).    In the kitchen   Dycem mat (rubber jar opener mat) - to help open jars, but also keep things from sliding around while you are working on them.    Double suction cup pads ("little Octopus") - to hold items while you use or wash them.   Electric can opener with a lid magnet strong enough to hold the can in the air - for one handed use.   In the bedroom   Back scratcher.   Large sleeve shirts and tops.   Put away clothing which buttons, fastens or snaps in the back or which uses drawstrings.    Sports bra or a camisole instead of a bra.   [employer] nylons can be pulled on one handed - most others can't.   A "wash and wear" haircut.     "

## 2024-08-15 NOTE — ANESTHESIA PREPROCEDURE EVALUATION
08/15/2024  Anderson Bell is a 48 y.o., male.    Procedure: TENOSYNOVECTOMY, FLEXOR RING (Left)   Anesthesia type: Local MAC   Diagnosis: Stenosing tenosynovitis of finger of left hand [M65.842]     Patient Active Problem List   Diagnosis    Tenosynovitis     No past surgical history on file.  Current Discharge Medication List        CONTINUE these medications which have NOT CHANGED    Details   CIALIS 20 mg Tab Take 20 mg by mouth once daily.      ondansetron (ZOFRAN) 8 MG tablet Take 1 tablet (8 mg total) by mouth every 8 (eight) hours as needed for Nausea.  Qty: 25 tablet, Refills: 0    Comments: Please deliver to bedside at [facility]  Associated Diagnoses: Stenosing tenosynovitis of finger of left hand      traMADoL (ULTRAM) 50 mg tablet Take 1 tablet (50 mg total) by mouth every 6 (six) hours.  Qty: 25 tablet, Refills: 0    Comments: Please deliver to bedside at [facility]  Associated Diagnoses: Stenosing tenosynovitis of finger of left hand             Pre-op Assessment    I have reviewed the Patient Summary Reports.     I have reviewed the Nursing Notes. I have reviewed the NPO Status.   I have reviewed the Medications.     Review of Systems  Anesthesia Hx:             Denies Family Hx of Anesthesia complications.    Denies Personal Hx of Anesthesia complications.                        Physical Exam    Airway:  Mallampati: II / II  Mouth Opening: Normal  TM Distance: Normal  Tongue: Normal  Neck ROM: Normal ROM    Dental:  Intact    Anesthesia Plan  Type of Anesthesia, risks & benefits discussed:    Anesthesia Type: Gen Natural Airway  Intra-op Monitoring Plan: Standard ASA Monitors  Post Op Pain Control Plan: multimodal analgesia and IV/PO Opioids PRN  Induction:  IV  Airway Plan: Direct  Informed Consent: Informed consent signed with the Patient and all parties  understand the risks and agree with anesthesia plan.  All questions answered.   ASA Score: 2    Ready For Surgery From Anesthesia Perspective.   .

## 2024-08-15 NOTE — TRANSFER OF CARE
"Anesthesia Transfer of Care Note    Patient: Anderson Bell    Procedure(s) Performed: Procedure(s) (LRB):  TENOSYNOVECTOMY, FLEXOR RING (Left)    Patient location: PACU    Anesthesia Type: general    Transport from OR: Transported from OR on 6-10 L/min O2 by face mask with adequate spontaneous ventilation    Post pain: adequate analgesia    Post assessment: no apparent anesthetic complications and tolerated procedure well    Post vital signs: stable    Level of consciousness: sedated    Nausea/Vomiting: no nausea/vomiting    Complications: none    Transfer of care protocol was followed      Last vitals: Visit Vitals  /61 (BP Location: Right arm, Patient Position: Lying)   Pulse 83   Temp 37.1 °C (98.8 °F) (Temporal)   Resp 16   Ht 5' 6" (1.676 m)   Wt 102.5 kg (226 lb)   SpO2 97%   BMI 36.48 kg/m²     "

## 2024-08-15 NOTE — INTERVAL H&P NOTE
The patient has been examined and the H&P has been reviewed:    I concur with the findings and no changes have occurred since H&P was written.    Anesthesia/Surgery risks, benefits and alternative options discussed and understood by patient/family.          Active Hospital Problems    Diagnosis  POA    *Tenosynovitis [M65.9]  Unknown      Resolved Hospital Problems   No resolved problems to display.

## 2024-08-15 NOTE — ANESTHESIA POSTPROCEDURE EVALUATION
Anesthesia Post Evaluation    Patient: Anderson Bell    Procedure(s) Performed: Procedure(s) (LRB):  TENOSYNOVECTOMY, FLEXOR RING (Left)    Final Anesthesia Type: general      Patient location during evaluation: PACU  Patient participation: Yes- Able to Participate  Level of consciousness: awake and alert and oriented  Post-procedure vital signs: reviewed and stable  Pain management: adequate  Airway patency: patent    PONV status at discharge: No PONV  Anesthetic complications: no      Cardiovascular status: hemodynamically stable  Respiratory status: nasal cannula  Hydration status: euvolemic  Follow-up not needed.          Vitals Value Taken Time   BP 98/60 08/15/24 0901   Temp 36.8 °C (98.2 °F) 08/15/24 0842   Pulse 73 08/15/24 0915   Resp 17 08/15/24 0912   SpO2 96 % 08/15/24 0915   Vitals shown include unfiled device data.      Event Time   Out of Recovery 09:12:00         Pain/Harvinder Score: Pain Rating Prior to Med Admin: 7 (8/15/2024  6:55 AM)  Harvinder Score: 10 (8/15/2024  9:00 AM)

## 2024-08-15 NOTE — PLAN OF CARE
Pre op complete. Waiting on site iris, update, anesth consent. Wife at bedside. Belongings with wife. Pt resting comfortably with all questions addressed at this time and call light in reach.

## 2024-08-15 NOTE — DISCHARGE SUMMARY
[facility] ([facility])  Discharge Note  Short Stay    Procedure(s) (LRB):  TENOSYNOVECTOMY, FLEXOR RING (Left)      OUTCOME: Patient tolerated treatment/procedure well without complication and is now ready for discharge.    DISPOSITION: Home or Self Care    FINAL DIAGNOSIS:  Tenosynovitis    FOLLOWUP: In clinic    DISCHARGE INSTRUCTIONS:    Discharge Procedure Orders   Diet general     Keep surgical extremity elevated     Ice to affected area     Lifting restrictions     No driving, operating heavy equipment or signing legal documents while taking pain medication.     Leave dressing on - Keep it clean, dry, and intact until clinic visit     Call MD for:  temperature >100.4     Call MD for:  persistent nausea and vomiting     Call MD for:  severe uncontrolled pain     Call MD for:  difficulty breathing, headache or visual disturbances     Call MD for:  redness, tenderness, or signs of infection (pain, swelling, redness, odor or green/yellow discharge around incision site)     Call MD for:  hives     Call MD for:  persistent dizziness or light-headedness     Call MD for:  extreme fatigue

## 2024-08-15 NOTE — BRIEF OP NOTE
Brief Operative Note     SUMMARY     Surgery Date: 8/15/2024     Surgeons and Role:     * Sophy Melissa MD - Primary    Assisting Surgeon: Dr. Dakota Ko, PGY-6     Pre-op Diagnosis:  Stenosing tenosynovitis of finger of left hand [M65.842]    Post-op Diagnosis:  Stenosing tenosynovitis of finger of left hand [M65.842]    Procedure(s) (LRB):  TENOSYNOVECTOMY, FLEXOR RING (Left)    Anesthesia: Local MAC    Description of Procedure:   Release of the A1 pulley of the left ring finger    Findings/Key Components:  No further triggering after pulley release     Estimated Blood Loss: Minimal         Specimens Removed:   Specimen (24h ago, onward)      None

## 2024-08-28 ENCOUNTER — CLINICAL SUPPORT (OUTPATIENT)
Dept: REHABILITATION | Facility: HOSPITAL | Age: 49
End: 2024-08-28
Attending: ORTHOPAEDIC SURGERY
Payer: COMMERCIAL

## 2024-08-28 DIAGNOSIS — M79.645 PAIN IN LEFT FINGER(S): Primary | ICD-10-CM

## 2024-08-28 PROCEDURE — 97165 OT EVAL LOW COMPLEX 30 MIN: CPT

## 2024-08-28 PROCEDURE — 97110 THERAPEUTIC EXERCISES: CPT

## 2024-08-28 NOTE — PATIENT INSTRUCTIONS
"[facility], OCCUPATIONAL THERAPY  HOME EXERCISE PROGRAM     Precautions:  - No heavy lifting, gripping, and pinching  - No weightbearing (no pushing or pulling)    Complete the following two massages for 10 minutes, 2x/day:                                                       Complete the following exercises for 10 repetitions, 4-5x/day:     AROM: DIP Flexion / Extension  Pinch middle knuckle to prevent bending.   Bend end knuckle until stretch is felt. Hold   3 seconds. Relax. Straighten finger as far as possible.      AROM: PIP Flexion / Extension  Pinch bottom knuckle  to prevent bending.   Actively bend middle knuckle until stretch is felt.   Hold 3 seconds. Relax. Straighten finger as far as possible.  AROM: Isolated MCP Flexion / Extension ("Wave")   Bend only your large, bottom knuckles. Hold 3 seconds.   Keep the tips of your fingers straight. Straighten fingers.      AROM: Isolated IPJ Flexion / Extension ("Hook")   Bend only your middle and end knuckles. Hold 3 seconds.   Straighten your fingers.       AROM: MCP and PIP Flexion / Extension ("Straight Fist")  Bend your bottom and middle knuckles, keeping the tips of your fingers straight.   Try to touch the pads of your fingers on your palm. Hold 3 seconds.   Straighten your fingers.       AROM: Composite Flexion / Extension ("Full Fist")  Bend every joint in your hand into a fist. Hold 3 seconds.   Straighten your fingers.         AROM: Composte Extension ("Finger Lifts")  Lift your finger off of the table one at a time. Hold 3 seconds.   Relax your finger.      AROM: Abduction / Adduction  With hand flat on table, spread all fingers apart,   then bring them together as close as possible.      Therapist: ALAN Jane       "

## 2024-08-28 NOTE — PLAN OF CARE
"[facility]  Occupational Therapy Initial Evaluation     Name: Anderson Bell  Clinic Number: [id number]    Therapy Diagnosis: No diagnosis found.  Physician: Sophy Melissa MD    Physician Orders: Eval and Treat  Medical Diagnosis: M65.842 (ICD-10-CM) - Other synovitis and tenosynovitis, left hand  Surgical Procedure and Date:  8/15/2024 Flexor tenosynovectomy left ring finger,   Evaluation Date: 8/28/2024  Insurance Authorization Period Expiration: 7/22/2024 - 12/31/2024   Plan of Care Certification Period: 12 weeks; 11/22/2024  Date of Return to MD: 9/4/2024  Visit # / Visits authorized: 1 / 1  FOTO: 1/ 3    Precautions:  Standard    Time In: 2  Time Out: 3  Total Billable Time: 60 minutes    Subjective       History of Current Condition/Mechanism of Injury: Anderson reports: Pt reports he experienced pain and catching in his ring finger for almost 2 years prior to this procedure.       Involved Side: Left   Dominant Side: Right    Surgical Procedure:  8/15/2024 Flexor tenosynovectomy left ring finger      Pain:  Functional Pain Scale Rating 0-10:   1/10 on average  3/10 at worst  Location: PIPjoint   Description: Aching  Aggravating Factors: Extension      Occupation:  / administration    Duties: office work     Functional Limitations/Social History:    Previous functional status includes: Independent with all ADLs. I    Current Functional Status   Home/Living environment: lives with their family          Limitation of Functional Status as follows:   ADLs/IADLs:     - Feeding: I    - Bathing: I    - Dressing: I  - Grooming: I    - Home Management: I    - Driving: I     Leisure: weightlifting    Patient's Goals for Therapy: " use it like normal, without pain .     Past Medical History/Physical Systems Review:   Anderson Bell  has no past medical history on file.    Anderson Bell  has a past surgical history that includes Arthroscopy of both knees; Carpal " tunnel release (Right); Debridement tennis elbow (Left); and Tenosynovectomy (Left, 8/15/2024).    Anderson has a current medication list which includes the following prescription(s): cialis, ondansetron, and tramadol.    Review of patient's allergies indicates:  No Known Allergies     Objective     Observation/Appearance:  Post op dressing in place; removed, wound healing well sutures removed today.       Hand ROM. Measured in degrees.   8/28/2024 8/28/2024    Right  Left         Index: MP   /55              PIP      /90              DIP  /65              GIPSON          Long:  MP  /70              PIP  /95              DIP  /65              GIPSON          Ring:   MP /70 /60              PIP /95 /80              DIP /70 /55              GIPSON PIP of SF         Small:  MP  /65               PIP  /80               DIP  /80              GIPSON          Thumb: MP /35 /40                IP /60 /70       Rad ADD/ABD 60 60       Pal ADD/ABD 60 60          Opposition PIP pf small figner PIP of small finger       Strength (Dyanmometer) and Pinch Strength (Pinch Gauge)  Measured in pounds and psi. Average of three trials.   8/28/2024 8/28/2024    Right  Left    Rung II     Bolton Pinch     3pt Pinch     2pt Pinch         Intake Outcome Measure for FOTO hand  Survey    Therapist reviewed FOTO scores for Anderson Bell on 8/28/2024.   FOTO report - see Media section or FOTO account episode details.    Intake Score: 56%         Treatment     Total Treatment time separate from Evaluation time:    Anderson received the treatments listed below:        hot pack for 10 minutes to L hand .    manual therapy techniques: Soft tissue Mobilization were applied to the: L hand for 8 minutes, including:  IASTM with tissue movers.     therapeutic exercises to develop ROM for 10 minutes including:  Initiate HEP        Patient Education and Home Exercises      Education provided:   -role of OT, goals for OT, scheduling/cancellations, insurance  limitations with patient.  -Additional Education provided: HEP    Written Home Exercises Provided: Yes.  Exercises were reviewed and Anderson was able to demonstrate them prior to the end of the session.    Anderson demonstrated good  understanding of the education provided.     Pt was advised to perform these exercises free of pain, and to stop performing them if pain occurs.    See EMR under Patient Instructions for exercises provided prior visit.    Assessment     Anderson Bell is a 48 y.o. male referred to outpatient occupational therapy and presents with a medical diagnosis of other synovitis and tenosynovitis, left hand.    Pt presents with good ROM and mild pain in PIP of L ring finger. Pt will progress with HEP at this time and return PRN.   Following medical record review it is determined that pt will benefit from occupational therapy services in order to maximize pain free and/or functional use of left hand. The following goals were discussed with the patient and patient is in agreement with them as to be addressed in the treatment plan. The patient's rehab potential is Good.     Anticipated barriers to occupational therapy: none    Plan of care discussed with patient: Yes  Patient's spiritual, cultural and educational needs considered and patient is agreeable to the plan of care and goals as stated below:     Medical Necessity is demonstrated by the following  Occupational Profile/History  Co-morbidities and personal factors that may impact the plan of care [x] LOW: Brief chart review  [] MODERATE: Expanded chart review   [] HIGH: Extensive chart review    Moderate / High Support Documentation: -     Examination  Performance deficits relating to physical, cognitive or psychosocial skills that result in activity limitations and/or participation restrictions  [x] LOW: addressing 1-3 Performance deficits  [] MODERATE: 3-5 Performance deficits  [] HIGH: 5+ Performance deficits (please support  below)    Moderate / High Support Documentation:    Physical:  Joint Mobility  Joint Stability  Muscle Power/Strength  Muscle Endurance  Edema   Strength  Pinch Strength  Gross Motor Coordination  Fine Motor Coordination  Pain    Cognitive:  No Deficits    Psychosocial:    No Deficits     Treatment Options [] LOW: Limited options  [] MODERATE: Several options  [] HIGH: Multiple options      Decision Making/ Complexity Score: low       The following goals were discussed with the patient and patient is in agreement with them as to be addressed in the treatment plan.     Goals:     Long Term Goals: (in 6-8 weeks)  1) Decrease pain in L hand to no more than 2/10 worst in ADL/IADL's  2) Increase AROM of L Ring finger to WFL for increased functioning in ADL/IADL's  3) Increase strength in L hand to 60 lbs for improved functioning in ADL/IADL's  4) Increased functioning in ADL/IADL's, as evidenced by a FOTO impairment rating of 70  5) Decrease edema in L hand to trace or none       Plan   Certification Period/Plan of care expiration: 8/28/2024 to 11/22/2024.    Outpatient Occupational Therapy 1-2 times weekly for 10 weeks to include the following interventions: Paraffin, Fluidotherapy, Manual therapy/joint mobilizations, Modalities for pain management, US 3 mhz, Therapeutic exercises/activities., Iontophoresis with 2.0 cc Dexamethasone, Strengthening, Orthotic Fabrication/Fit/Training, Edema Control, Scar Management, Wound Care, Electrical Modalities, Joint Protection, and Energy Conservation.    Selene Santillan OT        Physician's Signature: _________________________________________ Date: ________________

## 2024-09-04 ENCOUNTER — OFFICE VISIT (OUTPATIENT)
Dept: ORTHOPEDICS | Facility: CLINIC | Age: 49
End: 2024-09-04
Payer: COMMERCIAL

## 2024-09-04 DIAGNOSIS — M65.842 STENOSING TENOSYNOVITIS OF FINGER OF LEFT HAND: ICD-10-CM

## 2024-09-04 DIAGNOSIS — Z98.890 POST-OPERATIVE STATE: Primary | ICD-10-CM

## 2024-09-04 PROCEDURE — 99999 PR PBB SHADOW E&M-EST. PATIENT-LVL II: CPT | Mod: PBBFAC,,, | Performed by: ORTHOPAEDIC SURGERY

## 2024-09-04 PROCEDURE — 1159F MED LIST DOCD IN RCRD: CPT | Mod: CPTII,S$GLB,, | Performed by: ORTHOPAEDIC SURGERY

## 2024-09-04 PROCEDURE — 1160F RVW MEDS BY RX/DR IN RCRD: CPT | Mod: CPTII,S$GLB,, | Performed by: ORTHOPAEDIC SURGERY

## 2024-09-04 PROCEDURE — 99024 POSTOP FOLLOW-UP VISIT: CPT | Mod: S$GLB,,, | Performed by: ORTHOPAEDIC SURGERY

## 2024-09-04 NOTE — PROGRESS NOTES
Anderson Bell presents for post-operative evaluation of   Encounter Diagnoses   Name Primary?    Post-operative state Yes    Stenosing tenosynovitis of finger of left hand    .   History of Present Illness     The patient is now 2 weeks 6 days s/p 8/15/24 Left ring flexor tenosynovectomy.  Overall the patient reports doing well.  He reports stiffness in the PIP joint, no tenderness at the incision or tendons.  Vitals:    09/04/24 0838   PainSc:   6   PainLoc: Finger       PE:    AA&O x 4.  NAD  HEENT:  NCAT, sclera nonicteric  Lungs:  Respirations are equal and unlabored.  CV:  2+ bilateral upper and lower extremity pulses.  MSK: The incision is well healed.  Lacks 15 degree full ext PIP joint, nontender to palpation flexor tendon.  Neurovascularly intact and has 5/5 thenar and intrinsic musculature strength.    Physical Exam         Diagnostic studies and other clinical records review:  Results       Last xray 7/22/24 left ring finger    Assessment & Plan: Status post above, doing well     Assessment & Plan       Continue with range of motion, work in therapy strengthening at 6 weeks  F/U 6 weeks   Call with any questions/concerns in the interim        Sophy Melissa MD    Please be aware that this note has been generated with the assistance of [email or web address] voice-to-text.  Please excuse any spelling or grammatical errors.    This note was generated with the assistance of ambient listening technology. Verbal consent was obtained by the patient and accompanying visitor(s) for the recording of patient appointment to facilitate this note. I attest to having reviewed and edited the generated note for accuracy, though some syntax or spelling errors may persist. Please contact the author of this note for any clarification.

## 2024-09-09 PROBLEM — M25.642 FINGER STIFFNESS, LEFT: Status: ACTIVE | Noted: 2024-09-09

## 2024-09-09 PROBLEM — M79.642 PAIN IN LEFT HAND: Status: ACTIVE | Noted: 2024-08-28

## 2024-09-09 NOTE — PROGRESS NOTES
[facility]  Occupational Therapy Treatment Note     Date: 9/10/2024  Name: Anderson Romero [facility]  Clinic Number: [id number]    Therapy Diagnosis:   Encounter Diagnosis   Name Primary?    Pain in left finger(s) Yes     Physician: Sophy Melissa MD    Physician Orders: Eval and Treat  Medical Diagnosis: M65.842 (ICD-10-CM) - Other synovitis and tenosynovitis, left hand  Surgical Procedure and Date:  8/15/2024 Flexor tenosynovectomy left ring finger,   Evaluation Date: 8/28/2024  Insurance Authorization Period Expiration: 7/22/2024 - 12/31/2024   Plan of Care Certification Period: 12 weeks; 11/22/2024  Date of Return to MD: 9/4/2024  Visit # / Visits authorized: 1 / 1    FOTO: visit 4     Precautions:  Standard     Time In: 12:25  pm  Time Out: 1:15 pm  Total Billable Time: 50  minutes    Subjective     Patient reports: My finger is still so painful,  I just can't make a fist.  He was compliant with home exercise program given last session.   Response to previous treatment: felt good but was sore  Functional change: unable to use my hand much    Pain: 5/10  Location: right ring  finger     Objective     Objective Measures updated at progress report unless specified.  -8 ring finger extension left hand  Treatment     Anderson received the treatments listed below:      Anderson received the following supervised modalities after being cleared for contradictions for 8 minutes:   Patient received fluidotherapy to left hand(s) for 8 minutes to increase blood flow, circulation, desensitization, sensory re-education and for pain management.     manual therapy techniques: Joint mobilizations, Soft tissue Mobilization, and Friction Massage were applied to the: left palm and ring finger for 23 minutes, including:  Edema massage of finger  Scar massage with hand tools to volar left palm at 5th MCP region  Cross friction massage of incision site  PIP joint mobilization     therapeutic exercises to develop ROM and  flexibility for 19 minutes including:  Isolated PIP flexion of ring finger  Ring finger abduction/adduction  Extension of finger from table top  Hook fist   Yellow band extension of ring finger opposing thumb  Full fist     Patient Education and Home Exercises     Education provided:   - encourage to perform his HEP  - Progress towards goals     Written Home Exercises Provided: Pt instructed to continue prior HEP.  Exercises were reviewed and Anderson was able to demonstrate them prior to the end of the session.  Anderson demonstrated good  understanding of the home exercise program provided. See electronic medical record under Patient Instructions for exercises provided during therapy sessions.       Assessment     Anderson is demonstrating full ring finger extension post therapy session.  He also is able to achieve finger tips to his palm while fisting.  He still has some pain over his PIP joint of left ring finger.     Anderson is progressing well towards his goals and there are no updates to goals at this time. Pt prognosis is Good.     Patient will continue to benefit from skilled outpatient occupational therapy to address the deficits listed in the problem list on initial evaluation provide patient/family education and to maximize patient's level of independence in the home and community environment.     Patient's spiritual, cultural and educational needs considered and patient agreeable to plan of care and goals.    Anticipated barriers to occupational therapy: none noted    Goals:  Short Term Goals: (in 4-6 weeks)(10/9/24)  1) Patient will be independent in HEP  2) Decrease pain in L hand to no more than 4/10 worst in ADL/IADL's  3) Increase AROM in Ring finger PIP extension for improved functioning in ADL/IADL's  Assess  strength when appropriate        Long Term Goals: (in 6-8 weeks) (10/23/24)  1) Decrease pain in L hand to no more than 2/10 worst in ADL/IADL's  2) Increase AROM of L Ring finger to WFL for increased  functioning in ADL/IADL's  3) Increase strength in L hand to 60 lbs for improved functioning in ADL/IADL's  4) Increased functioning in ADL/IADL's, as evidenced by a FOTO impairment rating of 70  5) Decrease edema in L hand to trace or none        Plan   Use of modalities for flexibility and pain of hand prn.  Progress with exercises to resistive per protocol to achieve goals.  Updates/Grading for next session: prn    ALAN Serrano   9/10/2024

## 2024-09-10 ENCOUNTER — CLINICAL SUPPORT (OUTPATIENT)
Dept: REHABILITATION | Facility: HOSPITAL | Age: 49
End: 2024-09-10
Attending: ORTHOPAEDIC SURGERY
Payer: COMMERCIAL

## 2024-09-10 DIAGNOSIS — M79.645 PAIN IN LEFT FINGER(S): Primary | ICD-10-CM

## 2024-09-10 PROCEDURE — 97022 WHIRLPOOL THERAPY: CPT | Mod: PN

## 2024-09-10 PROCEDURE — 97140 MANUAL THERAPY 1/> REGIONS: CPT | Mod: PN

## 2024-09-10 PROCEDURE — 97110 THERAPEUTIC EXERCISES: CPT | Mod: PN

## 2024-09-17 NOTE — PROGRESS NOTES
[facility]  Occupational Therapy Treatment Note     Date: 9/18/2024  Name: Anderson Romero [facility]  Clinic Number: [id number]    Therapy Diagnosis:   Encounter Diagnoses   Name Primary?    Pain in left hand Yes    Finger stiffness, left      Physician: Sophy Melissa MD    Physician Orders: Eval and Treat  Medical Diagnosis: M65.842 (ICD-10-CM) - Other synovitis and tenosynovitis, left hand  Surgical Procedure and Date:  8/15/2024 Flexor tenosynovectomy left ring finger,   Evaluation Date: 8/28/2024  Insurance Authorization Period Expiration: 7/22/2024 - 12/31/2024   Plan of Care Certification Period: 12 weeks; 11/22/2024  Date of Return to MD: 9/4/2024  Visit # / Visits authorized: 2 / 20    FOTO: visit 4     Precautions:  Standard     Time In: 3:20 pm  Time Out: 4:00 pm  Total Billable Time: 40  minutes    Subjective     Patient reports: The incision area is not as tender and seems to be moving better  He was compliant with home exercise program provided   Response to previous treatment: sore  Functional change: easier typing at work    Pain: 4/10  Location: right ring  finger     Objective     Objective Measures updated at progress report unless specified.  -8 ring finger extension left hand  Treatment     Anderson received the treatments listed below:      Anderson received the following supervised modalities after being cleared for contradictions for 8 minutes:   Patient received paraffin bath to left  hand(s) for 5 minutes to increase blood flow, circulation, pain management and for tissue elasticity prior to therex.      manual therapy techniques: Joint mobilizations, Soft tissue Mobilization, and Friction Massage were applied to the: left palm and ring finger for 23 minutes, including:  Edema massage of finger  Mini massager flat head scar management  Cross friction massage of incision site    therapeutic exercises to develop ROM and flexibility for 12 minutes including:  Isolated PIP  flexion of ring finger  Ring finger abduction/adduction  Extension of finger from table top  Large dowel intrinsic roll   Ergo gripper with 2 red bands x 3 min   Full fist achieved ring to palm  Rotation of large spheres in left hand x 3 min  Ice massage to left palm  Patient Education and Home Exercises     Education provided:   - encourage to perform his HEP  - Progress towards goals     Written Home Exercises Provided: Pt instructed to continue prior HEP.  Exercises were reviewed and Anderson was able to demonstrate them prior to the end of the session.  Anderson demonstrated good  understanding of the home exercise program provided. See electronic medical record under Patient Instructions for exercises provided during therapy sessions.       Assessment     Anderson with some improvement in active range of motion of ring finger post therapy session.  Tolerated scar management well.     Anderson is progressing well towards his goals and there are no updates to goals at this time. Pt prognosis is Good.     Patient will continue to benefit from skilled outpatient occupational therapy to address the deficits listed in the problem list on initial evaluation provide patient/family education and to maximize patient's level of independence in the home and community environment.     Patient's spiritual, cultural and educational needs considered and patient agreeable to plan of care and goals.    Anticipated barriers to occupational therapy: none noted    Goals:  Short Term Goals: (in 4-6 weeks)(10/9/24)  1) Patient will be independent in HEP  2) Decrease pain in L hand to no more than 4/10 worst in ADL/IADL's  3) Increase AROM in Ring finger PIP extension for improved functioning in ADL/IADL's  Assess  strength when appropriate        Long Term Goals: (in 6-8 weeks) (10/23/24)  1) Decrease pain in L hand to no more than 2/10 worst in ADL/IADL's  2) Increase AROM of L Ring finger to WFL for increased functioning in ADL/IADL's  3)  Increase strength in L hand to 60 lbs for improved functioning in ADL/IADL's  4) Increased functioning in ADL/IADL's, as evidenced by a FOTO impairment rating of 70  5) Decrease edema in L hand to trace or none        Plan   Use of modalities for flexibility and pain of hand prn.  Progress with exercises to resistive per protocol to achieve goals.  Updates/Grading for next session: prn    ALAN Serrano   9/18/2024

## 2024-09-18 ENCOUNTER — CLINICAL SUPPORT (OUTPATIENT)
Dept: REHABILITATION | Facility: HOSPITAL | Age: 49
End: 2024-09-18
Payer: COMMERCIAL

## 2024-09-18 DIAGNOSIS — M79.642 PAIN IN LEFT HAND: Primary | ICD-10-CM

## 2024-09-18 DIAGNOSIS — M25.642 FINGER STIFFNESS, LEFT: ICD-10-CM

## 2024-09-18 PROCEDURE — 97018 PARAFFIN BATH THERAPY: CPT | Mod: PN

## 2024-09-18 PROCEDURE — 97140 MANUAL THERAPY 1/> REGIONS: CPT | Mod: PN

## 2024-09-18 PROCEDURE — 97110 THERAPEUTIC EXERCISES: CPT | Mod: PN

## 2024-09-23 NOTE — PROGRESS NOTES
[facility]  Occupational Therapy Treatment Note     Date: 9/24/2024  Name: Anderson Romero [facility]  Clinic Number: [id number]    Therapy Diagnosis:   Encounter Diagnoses   Name Primary?    Pain in left hand Yes    Finger stiffness, left      Physician: Sopyh Melissa MD    Physician Orders: Eval and Treat  Medical Diagnosis: M65.842 (ICD-10-CM) - Other synovitis and tenosynovitis, left hand  Surgical Procedure and Date:  8/15/2024 Flexor tenosynovectomy left ring finger,   Evaluation Date: 8/28/2024  Insurance Authorization Period Expiration: 7/22/2024 - 12/31/2024   Plan of Care Certification Period: 12 weeks; 11/22/2024  Date of Return to MD: 9/4/2024  Visit # / Visits authorized: 3 / 20    FOTO: visit 4     Precautions:  Standard     Time In: 3:12 pm  Time Out: 4:00 pm  Total Billable Time: 48  minutes    Subjective     Patient reports:  My joint is the most painful my incision feels better.   He was compliant with home exercise program provided   Response to previous treatment: felt okay  Functional change: able to  a broom    Pain: 1/10  Location: right ring  finger     Objective     Objective Measures updated at progress report unless specified.  - ring finger extension left hand  Treatment     Anderson received the treatments listed below:      Anderson received the following supervised modalities after being cleared for contradictions:  Patient received paraffin bath to left  hand(s) for 5 minutes to increase blood flow, circulation, pain management and for tissue elasticity prior to therex.      manual therapy techniques: Joint mobilizations, Soft tissue Mobilization, and Friction Massage were applied to the: left palm and ring finger for 10 minutes, including:  Edema massage of finger  Mini massager flat head scar management  Hand tool for scar management   Cross friction massage of incision site    therapeutic exercises to develop ROM and flexibility for 33 minutes  including:  Isolated PIP flexion of ring finger  Passive composite ring finger with hold end range  Ring finger abduction/adduction                    Yellow band resisted abduction/adduction  Extension of finger from table top  Large dowel intrinsic roll   Ultra gripper setting 2 for removal of dowels from board x 21 reps  Yellow clip with 3pt pinch using ring and small finger pinch to replace dowels in board    Ice massage to left palm  Patient Education and Home Exercises     Education provided:   - encourage to perform his HEP  - Progress towards goals     Written Home Exercises Provided: Pt instructed to continue prior HEP.  Exercises were reviewed and nAderson was able to demonstrate them prior to the end of the session.  Anderson demonstrated good  understanding of the home exercise program provided. See electronic medical record under Patient Instructions for exercises provided during therapy sessions.       Assessment     Anderson with softening of his palmar incision of left hand.  Good performance of active motion.  Some fatigue with resistive tasks.     Anderson is progressing well towards his goals and there are no updates to goals at this time. Pt prognosis is Good.     Patient will continue to benefit from skilled outpatient occupational therapy to address the deficits listed in the problem list on initial evaluation provide patient/family education and to maximize patient's level of independence in the home and community environment.     Patient's spiritual, cultural and educational needs considered and patient agreeable to plan of care and goals.    Anticipated barriers to occupational therapy: none noted    Goals:  Short Term Goals: (in 4-6 weeks)(10/9/24)  1) Patient will be independent in HEP  2) Decrease pain in L hand to no more than 4/10 worst in ADL/IADL's  3) Increase AROM in Ring finger PIP extension for improved functioning in ADL/IADL's  Assess  strength when appropriate        Long Term Goals: (in  6-8 weeks) (10/23/24)  1) Decrease pain in L hand to no more than 2/10 worst in ADL/IADL's  2) Increase AROM of L Ring finger to WFL for increased functioning in ADL/IADL's  3) Increase strength in L hand to 60 lbs for improved functioning in ADL/IADL's  4) Increased functioning in ADL/IADL's, as evidenced by a FOTO impairment rating of 70  5) Decrease edema in L hand to trace or none        Plan   Use of modalities for flexibility and pain of hand prn.  Progress with exercises to resistive per protocol to achieve goals.  Updates/Grading for next session: prn    ALAN Serrano   9/24/2024

## 2024-09-24 ENCOUNTER — CLINICAL SUPPORT (OUTPATIENT)
Dept: REHABILITATION | Facility: HOSPITAL | Age: 49
End: 2024-09-24
Payer: COMMERCIAL

## 2024-09-24 DIAGNOSIS — M79.642 PAIN IN LEFT HAND: Primary | ICD-10-CM

## 2024-09-24 DIAGNOSIS — M25.642 FINGER STIFFNESS, LEFT: ICD-10-CM

## 2024-09-24 PROCEDURE — 97140 MANUAL THERAPY 1/> REGIONS: CPT | Mod: PN

## 2024-09-24 PROCEDURE — 97018 PARAFFIN BATH THERAPY: CPT | Mod: PN

## 2024-09-24 PROCEDURE — 97110 THERAPEUTIC EXERCISES: CPT | Mod: PN

## 2024-10-11 ENCOUNTER — TELEPHONE (OUTPATIENT)
Dept: ORTHOPEDICS | Facility: CLINIC | Age: 49
End: 2024-10-11
Payer: COMMERCIAL

## 2024-10-11 NOTE — TELEPHONE ENCOUNTER
Rescheduled patient's postop appointment.  Patient states that he is doing well just has a little bit of scar tissue over the incision along his MP.  Patient continues to be able to make a fist and doing well.

## 2024-10-30 ENCOUNTER — OFFICE VISIT (OUTPATIENT)
Dept: ORTHOPEDICS | Facility: CLINIC | Age: 49
End: 2024-10-30
Payer: COMMERCIAL

## 2024-10-30 DIAGNOSIS — M65.842 STENOSING TENOSYNOVITIS OF FINGER OF LEFT HAND: ICD-10-CM

## 2024-10-30 DIAGNOSIS — G89.29 CHRONIC PAIN OF LEFT HAND: ICD-10-CM

## 2024-10-30 DIAGNOSIS — Z98.890 POST-OPERATIVE STATE: ICD-10-CM

## 2024-10-30 DIAGNOSIS — M79.642 CHRONIC PAIN OF LEFT HAND: ICD-10-CM

## 2024-10-30 DIAGNOSIS — M15.2 OSTEOARTHRITIS OF PROXIMAL INTERPHALANGEAL (PIP) JOINT OF LEFT RING FINGER: Primary | ICD-10-CM

## 2024-10-30 PROCEDURE — 99999 PR PBB SHADOW E&M-EST. PATIENT-LVL III: CPT | Mod: PBBFAC,,, | Performed by: ORTHOPAEDIC SURGERY

## 2024-10-30 RX ORDER — METHYLPREDNISOLONE ACETATE 40 MG/ML
40 INJECTION, SUSPENSION INTRA-ARTICULAR; INTRALESIONAL; INTRAMUSCULAR; SOFT TISSUE
Status: DISCONTINUED | OUTPATIENT
Start: 2024-10-30 | End: 2024-10-30 | Stop reason: HOSPADM

## 2024-10-30 RX ADMIN — METHYLPREDNISOLONE ACETATE 40 MG: 40 INJECTION, SUSPENSION INTRA-ARTICULAR; INTRALESIONAL; INTRAMUSCULAR; SOFT TISSUE at 02:10

## 2024-11-01 ENCOUNTER — OFFICE VISIT (OUTPATIENT)
Dept: SPORTS MEDICINE | Facility: CLINIC | Age: 49
End: 2024-11-01
Payer: COMMERCIAL

## 2024-11-01 ENCOUNTER — HOSPITAL ENCOUNTER (OUTPATIENT)
Dept: RADIOLOGY | Facility: HOSPITAL | Age: 49
Discharge: HOME | End: 2024-11-01
Attending: PHYSICIAN ASSISTANT
Payer: COMMERCIAL

## 2024-11-01 DIAGNOSIS — G89.29 CHRONIC PAIN OF RIGHT ELBOW: ICD-10-CM

## 2024-11-01 DIAGNOSIS — M77.11 RIGHT LATERAL EPICONDYLITIS: ICD-10-CM

## 2024-11-01 DIAGNOSIS — G89.29 CHRONIC PAIN OF RIGHT ELBOW: Primary | ICD-10-CM

## 2024-11-01 DIAGNOSIS — M25.521 CHRONIC PAIN OF RIGHT ELBOW: Primary | ICD-10-CM

## 2024-11-01 DIAGNOSIS — M25.521 CHRONIC PAIN OF RIGHT ELBOW: ICD-10-CM

## 2024-11-01 PROCEDURE — 99999 PR PBB SHADOW E&M-EST. PATIENT-LVL II: CPT | Mod: PBBFAC,,, | Performed by: PHYSICIAN ASSISTANT

## 2024-11-01 PROCEDURE — 73080 X-RAY EXAM OF ELBOW: CPT | Mod: TC,RT

## 2024-11-01 PROCEDURE — 73080 X-RAY EXAM OF ELBOW: CPT | Mod: 26,RT,, | Performed by: RADIOLOGY

## 2024-11-01 RX ORDER — METHYLPREDNISOLONE ACETATE 40 MG/ML
40 INJECTION, SUSPENSION INTRA-ARTICULAR; INTRALESIONAL; INTRAMUSCULAR; SOFT TISSUE
Status: DISCONTINUED | OUTPATIENT
Start: 2024-11-01 | End: 2024-11-01 | Stop reason: HOSPADM

## 2024-11-01 RX ORDER — BUPIVACAINE HYDROCHLORIDE 2.5 MG/ML
1 INJECTION, SOLUTION INFILTRATION; PERINEURAL
Status: DISCONTINUED | OUTPATIENT
Start: 2024-11-01 | End: 2024-11-01 | Stop reason: HOSPADM

## 2024-11-01 RX ADMIN — BUPIVACAINE HYDROCHLORIDE 1 ML: 2.5 INJECTION, SOLUTION INFILTRATION; PERINEURAL at 10:11

## 2024-11-01 RX ADMIN — METHYLPREDNISOLONE ACETATE 40 MG: 40 INJECTION, SUSPENSION INTRA-ARTICULAR; INTRALESIONAL; INTRAMUSCULAR; SOFT TISSUE at 10:11

## 2024-11-05 ENCOUNTER — CLINICAL SUPPORT (OUTPATIENT)
Dept: REHABILITATION | Facility: HOSPITAL | Age: 49
End: 2024-11-05
Attending: ORTHOPAEDIC SURGERY
Payer: COMMERCIAL

## 2024-11-05 DIAGNOSIS — M65.842 STENOSING TENOSYNOVITIS OF FINGER OF LEFT HAND: ICD-10-CM

## 2024-11-05 DIAGNOSIS — M15.2 OSTEOARTHRITIS OF PROXIMAL INTERPHALANGEAL (PIP) JOINT OF LEFT RING FINGER: ICD-10-CM

## 2024-11-05 PROCEDURE — 97110 THERAPEUTIC EXERCISES: CPT | Mod: PN

## 2024-11-05 PROCEDURE — 97140 MANUAL THERAPY 1/> REGIONS: CPT | Mod: PN

## 2024-11-05 NOTE — PROGRESS NOTES
[facility] OUTPATIENT THERAPY AND WELLNESS  Occupational Therapy Treatment Note     Date: 11/5/2024  Name: Anderson Romero [facility]  Clinic Number: [id number]    Therapy Diagnosis:   Encounter Diagnoses   Name Primary?    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      Physician: Sophy Melissa MD    Physician Orders: Eval and Treat  Medical Diagnosis: M65.842 (ICD-10-CM) - Other synovitis and tenosynovitis, left hand  Surgical Procedure and Date:  8/15/2024 Flexor tenosynovectomy left ring finger,   Evaluation Date: 8/28/2024  Insurance Authorization Period Expiration: 7/22/2024 - 12/31/2024   Plan of Care Certification Period: 12 weeks; 11/22/2024  Date of Return to MD: 9/4/2024  Visit # / Visits authorized: 4 / 20    FOTO: visit 4     Precautions:  Standard     Time In: 2:41 pm  Time Out: 3:20 pm  Total Billable Time: 39 minutes    Subjective     Patient reports:  I had a shot in my ring finger joint.  My trigger finger is good.  He was compliant with home exercise program provided   Response to previous treatment:  felt fine  Functional change: able to  a broom    Pain: 0/10  Location: right ring  finger     Objective     Objective Measures updated at progress report unless specified.  - ring finger extension left hand     strength average of 3 trials  11/5/24  Right 59.3#  Left 66.2#    8/28/2024 8/28/2024 8/28/24      Right  Left              Index: MP    /55               PIP       /90               DIP   /65               GIPSON                 Long:  MP   /70               PIP   /95               DIP   /65               GIPSON                 Ring:   MP /70 /60 /65              PIP /95 /80 /95              DIP /70 /55 /63              GIPSON                 Small:  MP   /65                PIP   /80                DIP   /80               GIPSON          Treatment     Anderson received the treatments listed below:      manual therapy  techniques: Joint mobilizations, Soft tissue Mobilization, and Friction Massage were applied to the: left palm and ring finger for 20 minutes, including:  Edema massage of finger  Mini massager pointed head scar management  Extractor tool for volar scar  Cross friction massage of incision site    therapeutic exercises to develop ROM and flexibility for 19 minutes including:  assessment and exercise  Extension of finger from table top   Red clip  3pt pinch (ring and small)for placement of HiQ pieces in board x 21 reps    Patient Education and Home Exercises     Education provided:   - encourage to perform his HEP  - Progress towards goals     Written Home Exercises Provided: Pt instructed to continue prior HEP.  Exercises were reviewed and Anderson was able to demonstrate them prior to the end of the session.  Anderson demonstrated good  understanding of the home exercise program provided. See electronic medical record under Patient Instructions for exercises provided during therapy sessions.       Assessment     Anderson with reduction in scar adhesions of left palm at 4th metacarpal. Full AROM of his hand without discomfort.  Progressing with return to workouts.     Anderson is progressing well towards his goals and there are no updates to goals at this time. Pt prognosis is Good.     Patient will continue to benefit from skilled outpatient occupational therapy to address the deficits listed in the problem list on initial evaluation provide patient/family education and to maximize patient's level of independence in the home and community environment.     Patient's spiritual, cultural and educational needs considered and patient agreeable to plan of care and goals.    Anticipated barriers to occupational therapy: none noted    Goals:  Short Term Goals: (in 4-6 weeks)(10/9/24)  1) Patient will be independent in HEP  2) Decrease pain in L hand to no more than 4/10 worst in ADL/instrumental activities of daily living's- met  11/5/24  3) Increase AROM in Ring finger PIP extension for improved functioning in ADL/instrumental activities of daily living's- met  11/5/24  Assess  strength when appropriate-met 11/5/24 left involved exceeded right dominant hand        Long Term Goals: (in 6-8 weeks) (10/23/24)  1) Decrease pain in L hand to no more than 2/10 worst in ADL/instrumental activities of daily living's-met 11/5/24  2) Increase AROM of L Ring finger to WFL for increased functioning in ADL/instrumental activities of daily living's- met 11/5/24  3) Increase strength in L hand to 60 lbs for improved functioning in ADL/instrumental activities of daily living's- met 11/5/24  4) Increased functioning in ADL/IADL's, as evidenced by a FOTO impairment rating of 70  5) Decrease edema in L hand to trace or none        Plan   Use of modalities for flexibility and pain of hand prn.  Progress with exercises to resistive per protocol to achieve goals.  Updates/Grading for next session: prn    ALAN Serrano   11/5/2024

## 2024-11-12 NOTE — PROGRESS NOTES
[facility]  Occupational Therapy Treatment Note      Date: 11/14/2024  Name: Anderson Oroscomante  Clinic Number: [id number]    Therapy Diagnosis:   Encounter Diagnoses   Name Primary?    Pain in left hand Yes    Finger stiffness, left      Physician: Sophy Melissa MD    Physician Orders: Eval and Treat  Medical Diagnosis: M65.842 (ICD-10-CM) - Other synovitis and tenosynovitis, left hand  Surgical Procedure and Date:  8/15/2024 Flexor tenosynovectomy left ring finger,   Evaluation Date: 8/28/2024  Insurance Authorization Period Expiration: 7/22/2024 - 12/31/2024   Plan of Care Certification Period: 12 weeks; 11/22/2024  Date of Return to MD: 9/4/2024  Visit # / Visits authorized: 5 / 20    FOTO: visit 5     Precautions:  Standard     Time In: 1:50 pm  Time Out: 2;23 pm  Total Billable Time: 33 minutes    Date of last visit: 11/14/24  Total visits received:  5  Subjective     Patient reports:  I had a shot in my ring finger joint.  My trigger finger is good.  He was compliant with home exercise program provided   Response to previous treatment:  felt fine  Functional change: able to  a broom    Pain: 0/10  Location: left ring  finger     Objective     Objective Measures updated at progress report unless specified.  - ring finger extension left hand     strength average of 3 trials  11/5/24 11/14/24  Right 59.3#  Left 66.2#           67    8/28/2024 8/28/2024 8/28/24 11/5/24 11/13/24     Right  Left                Index: MP    /55                PIP       /90                DIP   /65                GIPSON                   Long:  MP   /70                PIP   /95                DIP   /65                GIPSON                   Ring:   MP /70 /60 /65 0/68              PIP /95 /80 /95 2/95              DIP /70 /55 /63 0/60              GIPSON                   Small:  MP   /65                 PIP   /80                 DIP   /80                GIPSON           Treatment     Anderson  received the treatments listed below:      Patient received fluidotherapy to left  hand(s) for 8 minutes to increase blood flow, circulation, desensitization, sensory re-education and for pain management.     manual therapy techniques: Joint mobilizations, Soft tissue Mobilization, and Friction Massage were applied to the: left palm and ring finger for 20 minutes, including:  Edema massage of finger  Hand tool for scar management  Cross friction massage of incision site  Extension of finger from table top     Patient Education and Home Exercises     Education provided:   - encourage to perform his HEP  - Progress towards goals     Written Home Exercises Provided: Pt instructed to continue prior HEP.  Exercises were reviewed and Anderson was able to demonstrate them prior to the end of the session.  Anderson demonstrated good  understanding of the home exercise program provided. See electronic medical record under Patient Instructions for exercises provided during therapy sessions.       Assessment     Anderson with return of full function of left hand with no pain, discomfort or limitations during functional tasks.     Anderson will be d/c from therapy goals all met.    Patient's spiritual, cultural and educational needs considered and patient agreeable to plan of care and goals.    Anticipated barriers to occupational therapy: none noted    Goals:  Short Term Goals: (in 4-6 weeks)(10/9/24)  1) Patient will be independent in HEP  2) Decrease pain in L hand to no more than 4/10 worst in ADL/instrumental activities of daily living's- met 11/5/24  3) Increase AROM in Ring finger PIP extension for improved functioning in ADL/instrumental activities of daily living's- met  11/5/24  Assess  strength when appropriate-met 11/5/24 left involved exceeded right dominant hand        Long Term Goals: (in 6-8 weeks) (10/23/24)  1) Decrease pain in L hand to no more than 2/10 worst in ADL/instrumental activities of daily living's-met  11/5/24  2) Increase AROM of L Ring finger to WFL for increased functioning in ADL/instrumental activities of daily living's- met 11/5/24  3) Increase strength in L hand to 60 lbs for improved functioning in ADL/instrumental activities of daily living's- met 11/5/24  4) Increased functioning in ADL/IADL's, as evidenced by a FOTO impairment rating of 70- exceeded goal  5) Decrease edema in L hand to trace or none - met       Plan   Pt will be discharged from occupational therapy.  ALAN Serrano   11/14/2024

## 2024-11-14 ENCOUNTER — CLINICAL SUPPORT (OUTPATIENT)
Dept: REHABILITATION | Facility: HOSPITAL | Age: 49
End: 2024-11-14
Payer: COMMERCIAL

## 2024-11-14 DIAGNOSIS — M25.642 FINGER STIFFNESS, LEFT: ICD-10-CM

## 2024-11-14 DIAGNOSIS — M79.642 PAIN IN LEFT HAND: Primary | ICD-10-CM

## 2024-11-14 PROCEDURE — 97140 MANUAL THERAPY 1/> REGIONS: CPT | Mod: PN

## 2024-11-14 PROCEDURE — 97022 WHIRLPOOL THERAPY: CPT | Mod: PN

## 2024-12-16 DIAGNOSIS — M25.529 ELBOW PAIN, UNSPECIFIED LATERALITY: ICD-10-CM

## 2024-12-16 DIAGNOSIS — G89.29 CHRONIC PAIN OF RIGHT ELBOW: Primary | ICD-10-CM

## 2024-12-16 DIAGNOSIS — M25.521 CHRONIC PAIN OF RIGHT ELBOW: Primary | ICD-10-CM

## 2024-12-26 ENCOUNTER — HOSPITAL ENCOUNTER (OUTPATIENT)
Dept: RADIOLOGY | Facility: OTHER | Age: 49
Discharge: HOME | End: 2024-12-26
Attending: PHYSICIAN ASSISTANT
Payer: COMMERCIAL

## 2024-12-26 DIAGNOSIS — G89.29 CHRONIC PAIN OF RIGHT ELBOW: ICD-10-CM

## 2024-12-26 DIAGNOSIS — M25.521 CHRONIC PAIN OF RIGHT ELBOW: ICD-10-CM

## 2024-12-26 PROCEDURE — 73221 MRI JOINT UPR EXTREM W/O DYE: CPT | Mod: 26,RT,, | Performed by: RADIOLOGY

## 2024-12-26 PROCEDURE — 73221 MRI JOINT UPR EXTREM W/O DYE: CPT | Mod: TC,RT

## 2024-12-27 ENCOUNTER — PATIENT MESSAGE (OUTPATIENT)
Dept: PREADMISSION TESTING | Facility: HOSPITAL | Age: 49
End: 2024-12-27
Payer: COMMERCIAL

## 2024-12-27 DIAGNOSIS — M77.11 RIGHT LATERAL EPICONDYLITIS: ICD-10-CM

## 2024-12-27 DIAGNOSIS — S56.511A PARTIAL TEAR OF COMMON EXTENSOR TENDON OF RIGHT ELBOW: Primary | ICD-10-CM

## 2024-12-27 NOTE — ANESTHESIA PAT ROS NOTE
12/27/2024  Anderson Bell is a 49 y.o., male.      Pre-op Assessment    I have reviewed the Patient Summary Reports.       I have reviewed the Medications.     Review of Systems  Anesthesia Hx:  No problems with previous Anesthesia   History of prior surgery of interest to airway management or planning:  Previous anesthesia: MAC, Nerve Block      for 10/27/2020  Right Carpal Tunnel Release.   8/15/2024  Left Elbow Debridement of Tennis Elbow, Tenosynovectomy with MAC.  Procedure performed at an [facility].    Denies Personal Hx of Anesthesia complications.                    Social:  No Alcohol Use, Non-Smoker       Hematology/Oncology:  Hematology Normal   Oncology Normal                                   EENT/Dental:  EENT/Dental Normal           Cardiovascular:  Cardiovascular Normal Exercise tolerance: good       Denies CAD.       Denies Angina.       Denies CROOK.    Patient not on beta blockers                          Pulmonary:  Pulmonary Normal    Denies Asthma.   Denies Shortness of breath.                  Renal/:  Renal/ Normal                 Hepatic/GI:      Denies GERD. Liver Disease,  Echogenic liver which may reflect fatty infiltration.  Small hepatic and left renal cysts.    Not Taking GLP-1 Agonists            Musculoskeletal:     Partial tear of common extensor tendon of right elbow,  Right lateral epicondylitis,  H/O Pain in left hand, Finger stiffness, left,  S/P Left Elbow Debridement, Tenosynovectomy,  Bilateral Knee Arthroscopies              Neurological:    Denies CVA.    Denies Headaches. Denies Seizures.    H/O Carpal Tunnel Release, right                            Endocrine:  Denies Diabetes. Denies Hypothyroidism.          Psych:  Psychiatric Normal                   No past medical history on file.  Past Surgical History:   Procedure Laterality Date     ARTHROSCOPY OF BOTH KNEES      CARPAL TUNNEL RELEASE Right     DEBRIDEMENT TENNIS ELBOW Left     TENOSYNOVECTOMY Left 8/15/2024    Procedure: TENOSYNOVECTOMY, FLEXOR RING;  Surgeon: Sophy Melissa MD;  Location: [facility];  Service: Orthopedics;  Laterality: Left;       Anesthesia Assessment: Preoperative EQUATION    Planned Procedure: Procedure(s) (LRB):  TENOTOMY,ELBOW,WITH DEBRIDEMENT AND TENDON REPAIR (Right)  Requested Anesthesia Type:General  Surgeon: Todd Rayo MD  Service: Orthopedics  Known or anticipated Date of Surgery:12/31/2024    Surgeon notes: reviewed    Electronic QUestionnaire Assessment completed via nurse interview with patient.        Triage considerations:     The patient has no apparent active cardiac condition (No unstable coronary Syndrome such as severe unstable angina or recent [<1 month] myocardial infarction, decompensated CHF, severe valvular   disease or significant arrhythmia)    Previous anesthesia records:MAC, Nerve block for post-op pain, and No problems    [facility]   Subspecialty notes: Ortho    Other important co-morbidities: Obesity      Tests already available:  Results have been reviewed.             Instructions given. (See in Nurse's note) Preop medication instructions sent via portal message.     Optimization:  Anesthesia Preop Clinic Assessment Not Indicated    Medical Opinion Indicated: No       Sub-specialist consult indicated: No      Plan: Consultation: medical clearance is not requested.              Patient is add-on to surgery schedule.     Navigation:  Straight Line to surgery.               No tests, anesthesia preop clinic visit, or consult required.                        Patient is OK to proceed with surgery at [facility].       Ht: 5'6  Wt: 102.5 kg (226 lb)  BMI: 36.5

## 2024-12-30 ENCOUNTER — OFFICE VISIT (OUTPATIENT)
Dept: SPORTS MEDICINE | Facility: CLINIC | Age: 49
End: 2024-12-30
Payer: COMMERCIAL

## 2024-12-30 ENCOUNTER — TELEPHONE (OUTPATIENT)
Dept: SPORTS MEDICINE | Facility: CLINIC | Age: 49
End: 2024-12-30

## 2024-12-30 VITALS
DIASTOLIC BLOOD PRESSURE: 80 MMHG | BODY MASS INDEX: 36.49 KG/M2 | WEIGHT: 227.06 LBS | SYSTOLIC BLOOD PRESSURE: 123 MMHG | HEART RATE: 92 BPM | HEIGHT: 66 IN

## 2024-12-30 DIAGNOSIS — S56.511A PARTIAL TEAR OF COMMON EXTENSOR TENDON OF RIGHT ELBOW: Primary | ICD-10-CM

## 2024-12-30 DIAGNOSIS — M77.11 RIGHT LATERAL EPICONDYLITIS: ICD-10-CM

## 2024-12-30 PROCEDURE — 99999 PR PBB SHADOW E&M-EST. PATIENT-LVL III: CPT | Mod: PBBFAC,,, | Performed by: ORTHOPAEDIC SURGERY

## 2024-12-30 PROCEDURE — 99499 UNLISTED E&M SERVICE: CPT | Mod: S$GLB,,, | Performed by: ORTHOPAEDIC SURGERY

## 2024-12-30 RX ORDER — MUPIROCIN 20 MG/G
OINTMENT TOPICAL
Status: CANCELLED | OUTPATIENT
Start: 2024-12-30

## 2024-12-30 NOTE — H&P (VIEW-ONLY)
History 12/30/2024  Anderson returns here today to complete preoperative paperwork. Surgical intervention has been recommended and he is to undergo a  right elbow, open, lateral epicondylar debridement with repair of the common extensor tendon on December 31st 2024.    He has been treated conservatively by Glen Patel PA-C. History below. He has no had any long term relief and would like to proceed with surgical intervention     History 11/01/2024  49 y.o. Male with a history of right elbow pain who I have previously treated for his back.  He complains today of having recurrent lateral-sided elbow pain over the last 6 weeks.  He 1st began having pain approximately 2 years ago and was seen by another doctor.  He has received 2 injections into the lateral elbow.  The 1st injection lasted approximately 1 year.  The 2nd injection was given 6 months ago and gave him good relief for the 1st few months.        - mechanical symptoms, - instability    Is affecting ADLs.  Pain is 10/10 at it's worst.        PAST MEDICAL HISTORY    No past medical history on file.    PAST SURGICAL HISTORY     Past Surgical History:   Procedure Laterality Date    ARTHROSCOPY OF BOTH KNEES      CARPAL TUNNEL RELEASE Right     DEBRIDEMENT TENNIS ELBOW Left     TENOSYNOVECTOMY Left 8/15/2024    Procedure: TENOSYNOVECTOMY, FLEXOR RING;  Surgeon: Sophy Melissa MD;  Location: [facility];  Service: Orthopedics;  Laterality: Left;       FAMILY HISTORY    No family history on file.    SOCIAL HISTORY    Social History     Socioeconomic History    Marital status:    Tobacco Use    Smoking status: Never    Smokeless tobacco: Never   Substance and Sexual Activity    Alcohol use: Never    Drug use: Never    Sexual activity: Yes     Partners: Female     Social Drivers of Health     Financial Resource Strain: Low Risk  (3/6/2024)    Overall Financial Resource Strain ([facility])     Difficulty of Paying Living Expenses: Not hard at all   Food Insecurity:  Unknown (3/6/2024)    Hunger Vital Sign     Worried About Running Out of Food in the Last Year: Never true     Ran Out of Food in the Last Year: Patient declined   Transportation Needs: No Transportation Needs (3/6/2024)    PRAPARE - Transportation     Lack of Transportation (Medical): No     Lack of Transportation (Non-Medical): No   Physical Activity: Sufficiently Active (3/6/2024)    Exercise Vital Sign     Days of Exercise per Week: 5 days     Minutes of Exercise per Session: 50 min   Stress: No Stress Concern Present (3/6/2024)    [employer] - Occupational Stress Questionnaire     Feeling of Stress : Not at all   Housing Stability: High Risk (3/6/2024)    Housing Stability Vital Sign     Unable to Pay for Housing in the Last Year: Patient declined     Unstable Housing in the Last Year: Yes       MEDICATIONS      Current Outpatient Medications:     CIALIS 20 mg Tab, Take 20 mg by mouth once daily., Disp: , Rfl:     ondansetron (ZOFRAN) 8 MG tablet, Take 1 tablet (8 mg total) by mouth every 8 (eight) hours as needed for Nausea., Disp: 25 tablet, Rfl: 0    traMADoL (ULTRAM) 50 mg tablet, Take 1 tablet (50 mg total) by mouth every 6 (six) hours., Disp: 25 tablet, Rfl: 0    ALLERGIES     Review of patient's allergies indicates:  No Known Allergies      REVIEW OF SYSTEMS   Constitution: Negative. Negative for chills, fever and night sweats.   HENT: Negative for congestion and headaches.    Eyes: Negative for blurred vision, left vision loss and right vision loss.   Cardiovascular: Negative for chest pain and syncope.   Respiratory: Negative for cough and shortness of breath.    Endocrine: Negative for polydipsia, polyphagia and polyuria.   Hematologic/Lymphatic: Negative for bleeding problem. Does not bruise/bleed easily.   Skin: Negative for dry skin, itching and rash.   Musculoskeletal: Negative for falls. Positive for right elbow pain.  Gastrointestinal: Negative for abdominal pain  and bowel incontinence.   Genitourinary: Negative for bladder incontinence and nocturia.   Neurological: Negative for disturbances in coordination, loss of balance and seizures.   Psychiatric/Behavioral: Negative for depression. The patient does not have insomnia.    Allergic/Immunologic: Negative for hives and persistent infections.     PHYSICAL EXAMINATION    Vitals: There were no vitals taken for this visit.    General: The patient appears active and healthy with no apparent physical problems.  No disturbance of mood or affect is demonstrated. Alert and Oriented.    HEENT: Eyes normal, pupils equally round, nose normal.    Resp: Equal and symmetrical chest rises. No wheezing    CV: Regular rate    Neck: Supple; nonpainful range of motion.    Extremities: no cyanosis, clubbing, edema, or diffuse swelling.  Palpable pulses, good capillary refill of the digits.  No coolness, discoloration, edema or obvious varicosities.    Skin: no lesions noted.    Lymphatic: no detected adenopathy in the upper or lower extremities.    Neurologic: normal mental status, normal reflexes, normal gait and balance.  Patient is alert and oriented to person, place and time.  No flaccidity or spasticity is noted.  No motor or sensory deficits are noted.  Light touch is intact    Orthopaedic: Elbow Exam - RIGHT    Inspection: Normal skin color and appearance, no ecchymosis, no swelling, no scars.  There is a normal carrying angle.      ROM: 0° - 135+° with 80° supination and 80° pronation.       Palpation: No tenderness at the medial epicondyle, olecranon, or radial head. +tenderness of the lateral epicondyle.      The wrist flexor-pronator muscle group is nontender.    The wrist extensor mobile wad is tender.    Strength: Flexor and extensor motor strength is 5/5.      Stability: Varus and valgus stress reveals no instability. No Guarding    Neuro Reflexes are 2/2 biceps, brachioradialis and triceps. Sensation intact    Test: - Milking  maneuver - VEO - Thinker's - Tinel's Sign - Ulnar nerve subluxation - Hook Test + Pain with resisted wrist ext + Pain with long finger ext      IMAGING    Xrays include 3 Views right elbow are ordered / images reviewed by me:  There is normal bone quality in alignment.  There is no evidence of fractures or dislocations.  There is no evidence of loose bodies.  There is no osteochondral defects.  There is no significant joint space narrowing or gapping.  No calcifications noted along the medial or lateral epicondyles      IMPRESSION       ICD-10-CM ICD-9-CM   1. Partial tear of common extensor tendon of right elbow  S56.511A 841.8   2. Right lateral epicondylitis  M77.11 726.32         MEDICATIONS PRESCRIBED      None    RECOMMENDATIONS     History and physical examination are consistent with recurrent lateral epicondylitis  Surgical and conservative treatment options were discussed  Since he has received and essentially failed 2 previous injections, I think his best long-term option may be surgical intervention  We briefly discussed the risks, benefits, and postoperative recovery of a lateral epicondylar debridement with repair of the common extensor tendons.  He had similar symptoms and ultimately underwent surgery for his left elbow and has done well.  He understands the risk of continued injections and is considering surgery in the near future however, he requested a repeat injection today  Activity modifications were given  Encouraged frequent icing as needed for pain  Right elbow lateral epicondylar CSI was administered under ultrasound guidance today  Return to clinic as needed            Todd Osman M.D.  [email or web address]

## 2024-12-30 NOTE — PROGRESS NOTES
ESTABLISHED PATIENT - NEW CONDITION    History 12/30/2024  Anderson returns here today to complete preoperative paperwork. Surgical intervention has been recommended and he is to undergo a  right elbow, open, lateral epicondylar debridement with repair of the common extensor tendon on December 31st 2024.    He has been treated conservatively by Glen Patel PA-C. History below. He has no had any long term relief and would like to proceed with surgical intervention     History 11/01/2024  49 y.o. Male with a history of right elbow pain who I have previously treated for his back.  He complains today of having recurrent lateral-sided elbow pain over the last 6 weeks.  He 1st began having pain approximately 2 years ago and was seen by another doctor.  He has received 2 injections into the lateral elbow.  The 1st injection lasted approximately 1 year.  The 2nd injection was given 6 months ago and gave him good relief for the 1st few months.        - mechanical symptoms, - instability    Is affecting ADLs.  Pain is 10/10 at it's worst.        PAST MEDICAL HISTORY    No past medical history on file.    PAST SURGICAL HISTORY     Past Surgical History:   Procedure Laterality Date    ARTHROSCOPY OF BOTH KNEES      CARPAL TUNNEL RELEASE Right     DEBRIDEMENT TENNIS ELBOW Left     TENOSYNOVECTOMY Left 8/15/2024    Procedure: TENOSYNOVECTOMY, FLEXOR RING;  Surgeon: Sophy Melissa MD;  Location: [facility];  Service: Orthopedics;  Laterality: Left;       FAMILY HISTORY    No family history on file.    SOCIAL HISTORY    Social History     Socioeconomic History    Marital status:    Tobacco Use    Smoking status: Never    Smokeless tobacco: Never   Substance and Sexual Activity    Alcohol use: Never    Drug use: Never    Sexual activity: Yes     Partners: Female     Social Drivers of Health     Financial Resource Strain: Low Risk  (3/6/2024)    Overall Financial Resource Strain (CARDIA)     Difficulty of Paying Living Expenses:  Not hard at all   Food Insecurity: Unknown (3/6/2024)    Hunger Vital Sign     Worried About Running Out of Food in the Last Year: Never true     Ran Out of Food in the Last Year: Patient declined   Transportation Needs: No Transportation Needs (3/6/2024)    PRAPARE - Transportation     Lack of Transportation (Medical): No     Lack of Transportation (Non-Medical): No   Physical Activity: Sufficiently Active (3/6/2024)    Exercise Vital Sign     Days of Exercise per Week: 5 days     Minutes of Exercise per Session: 50 min   Stress: No Stress Concern Present (3/6/2024)    [employer] - Occupational Stress Questionnaire     Feeling of Stress : Not at all   Housing Stability: High Risk (3/6/2024)    Housing Stability Vital Sign     Unable to Pay for Housing in the Last Year: Patient declined     Unstable Housing in the Last Year: Yes       MEDICATIONS      Current Outpatient Medications:     CIALIS 20 mg Tab, Take 20 mg by mouth once daily., Disp: , Rfl:     ondansetron (ZOFRAN) 8 MG tablet, Take 1 tablet (8 mg total) by mouth every 8 (eight) hours as needed for Nausea., Disp: 25 tablet, Rfl: 0    traMADoL (ULTRAM) 50 mg tablet, Take 1 tablet (50 mg total) by mouth every 6 (six) hours., Disp: 25 tablet, Rfl: 0    ALLERGIES     Review of patient's allergies indicates:  No Known Allergies      REVIEW OF SYSTEMS   Constitution: Negative. Negative for chills, fever and night sweats.   HENT: Negative for congestion and headaches.    Eyes: Negative for blurred vision, left vision loss and right vision loss.   Cardiovascular: Negative for chest pain and syncope.   Respiratory: Negative for cough and shortness of breath.    Endocrine: Negative for polydipsia, polyphagia and polyuria.   Hematologic/Lymphatic: Negative for bleeding problem. Does not bruise/bleed easily.   Skin: Negative for dry skin, itching and rash.   Musculoskeletal: Negative for falls. Positive for right elbow  pain.  Gastrointestinal: Negative for abdominal pain and bowel incontinence.   Genitourinary: Negative for bladder incontinence and nocturia.   Neurological: Negative for disturbances in coordination, loss of balance and seizures.   Psychiatric/Behavioral: Negative for depression. The patient does not have insomnia.    Allergic/Immunologic: Negative for hives and persistent infections.     PHYSICAL EXAMINATION    Vitals: There were no vitals taken for this visit.    General: The patient appears active and healthy with no apparent physical problems.  No disturbance of mood or affect is demonstrated. Alert and Oriented.    HEENT: Eyes normal, pupils equally round, nose normal.    Resp: Equal and symmetrical chest rises. No wheezing    CV: Regular rate    Neck: Supple; nonpainful range of motion.    Extremities: no cyanosis, clubbing, edema, or diffuse swelling.  Palpable pulses, good capillary refill of the digits.  No coolness, discoloration, edema or obvious varicosities.    Skin: no lesions noted.    Lymphatic: no detected adenopathy in the upper or lower extremities.    Neurologic: normal mental status, normal reflexes, normal gait and balance.  Patient is alert and oriented to person, place and time.  No flaccidity or spasticity is noted.  No motor or sensory deficits are noted.  Light touch is intact    Orthopaedic: Elbow Exam - RIGHT    Inspection: Normal skin color and appearance, no ecchymosis, no swelling, no scars.  There is a normal carrying angle.      ROM: 0° - 135+° with 80° supination and 80° pronation.       Palpation: No tenderness at the medial epicondyle, olecranon, or radial head. +tenderness of the lateral epicondyle.      The wrist flexor-pronator muscle group is nontender.    The wrist extensor mobile wad is tender.    Strength: Flexor and extensor motor strength is 5/5.      Stability: Varus and valgus stress reveals no instability. No Guarding    Neuro Reflexes are 2/2 biceps, brachioradialis  and triceps. Sensation intact    Test: - Milking maneuver - VEO - Thinker's - Tinel's Sign - Ulnar nerve subluxation - Hook Test + Pain with resisted wrist ext + Pain with long finger ext      IMAGING    Xrays include 3 Views right elbow are ordered / images reviewed by me:  There is normal bone quality in alignment.  There is no evidence of fractures or dislocations.  There is no evidence of loose bodies.  There is no osteochondral defects.  There is no significant joint space narrowing or gapping.  No calcifications noted along the medial or lateral epicondyles      IMPRESSION       ICD-10-CM ICD-9-CM   1. Partial tear of common extensor tendon of right elbow  S56.511A 841.8   2. Right lateral epicondylitis  M77.11 726.32         MEDICATIONS PRESCRIBED      None    RECOMMENDATIONS     History and physical examination are consistent with recurrent lateral epicondylitis  Surgical and conservative treatment options were discussed  Since he has received and essentially failed 2 previous injections, I think his best long-term option may be surgical intervention  We briefly discussed the risks, benefits, and postoperative recovery of a lateral epicondylar debridement with repair of the common extensor tendons.  He had similar symptoms and ultimately underwent surgery for his left elbow and has done well.  He understands the risk of continued injections and is considering surgery in the near future however, he requested a repeat injection today  Activity modifications were given  Encouraged frequent icing as needed for pain  Right elbow lateral epicondylar CSI was administered under ultrasound guidance today  Return to clinic as needed            Todd Osman M.D.  [email or web address]

## 2024-12-30 NOTE — H&P
History 12/30/2024  Anderson returns here today to complete preoperative paperwork. Surgical intervention has been recommended and he is to undergo a  right elbow, open, lateral epicondylar debridement with repair of the common extensor tendon on December 31st 2024.    He has been treated conservatively by Glen Patel PA-C. History below. He has no had any long term relief and would like to proceed with surgical intervention     History 11/01/2024  49 y.o. Male with a history of right elbow pain who I have previously treated for his back.  He complains today of having recurrent lateral-sided elbow pain over the last 6 weeks.  He 1st began having pain approximately 2 years ago and was seen by another doctor.  He has received 2 injections into the lateral elbow.  The 1st injection lasted approximately 1 year.  The 2nd injection was given 6 months ago and gave him good relief for the 1st few months.        - mechanical symptoms, - instability    Is affecting ADLs.  Pain is 10/10 at it's worst.        PAST MEDICAL HISTORY    No past medical history on file.    PAST SURGICAL HISTORY     Past Surgical History:   Procedure Laterality Date    ARTHROSCOPY OF BOTH KNEES      CARPAL TUNNEL RELEASE Right     DEBRIDEMENT TENNIS ELBOW Left     TENOSYNOVECTOMY Left 8/15/2024    Procedure: TENOSYNOVECTOMY, FLEXOR RING;  Surgeon: Sophy Melissa MD;  Location: [facility];  Service: Orthopedics;  Laterality: Left;       FAMILY HISTORY    No family history on file.    SOCIAL HISTORY    Social History     Socioeconomic History    Marital status:    Tobacco Use    Smoking status: Never    Smokeless tobacco: Never   Substance and Sexual Activity    Alcohol use: Never    Drug use: Never    Sexual activity: Yes     Partners: Female     Social Drivers of Health     Financial Resource Strain: Low Risk  (3/6/2024)    Overall Financial Resource Strain ([facility])     Difficulty of Paying Living Expenses: Not hard at all   Food Insecurity:  Unknown (3/6/2024)    Hunger Vital Sign     Worried About Running Out of Food in the Last Year: Never true     Ran Out of Food in the Last Year: Patient declined   Transportation Needs: No Transportation Needs (3/6/2024)    PRAPARE - Transportation     Lack of Transportation (Medical): No     Lack of Transportation (Non-Medical): No   Physical Activity: Sufficiently Active (3/6/2024)    Exercise Vital Sign     Days of Exercise per Week: 5 days     Minutes of Exercise per Session: 50 min   Stress: No Stress Concern Present (3/6/2024)    [employer] - Occupational Stress Questionnaire     Feeling of Stress : Not at all   Housing Stability: High Risk (3/6/2024)    Housing Stability Vital Sign     Unable to Pay for Housing in the Last Year: Patient declined     Unstable Housing in the Last Year: Yes       MEDICATIONS      Current Outpatient Medications:     CIALIS 20 mg Tab, Take 20 mg by mouth once daily., Disp: , Rfl:     ondansetron (ZOFRAN) 8 MG tablet, Take 1 tablet (8 mg total) by mouth every 8 (eight) hours as needed for Nausea., Disp: 25 tablet, Rfl: 0    traMADoL (ULTRAM) 50 mg tablet, Take 1 tablet (50 mg total) by mouth every 6 (six) hours., Disp: 25 tablet, Rfl: 0    ALLERGIES     Review of patient's allergies indicates:  No Known Allergies      REVIEW OF SYSTEMS   Constitution: Negative. Negative for chills, fever and night sweats.   HENT: Negative for congestion and headaches.    Eyes: Negative for blurred vision, left vision loss and right vision loss.   Cardiovascular: Negative for chest pain and syncope.   Respiratory: Negative for cough and shortness of breath.    Endocrine: Negative for polydipsia, polyphagia and polyuria.   Hematologic/Lymphatic: Negative for bleeding problem. Does not bruise/bleed easily.   Skin: Negative for dry skin, itching and rash.   Musculoskeletal: Negative for falls. Positive for right elbow pain.  Gastrointestinal: Negative for abdominal pain  and bowel incontinence.   Genitourinary: Negative for bladder incontinence and nocturia.   Neurological: Negative for disturbances in coordination, loss of balance and seizures.   Psychiatric/Behavioral: Negative for depression. The patient does not have insomnia.    Allergic/Immunologic: Negative for hives and persistent infections.     PHYSICAL EXAMINATION    Vitals: There were no vitals taken for this visit.    General: The patient appears active and healthy with no apparent physical problems.  No disturbance of mood or affect is demonstrated. Alert and Oriented.    HEENT: Eyes normal, pupils equally round, nose normal.    Resp: Equal and symmetrical chest rises. No wheezing    CV: Regular rate    Neck: Supple; nonpainful range of motion.    Extremities: no cyanosis, clubbing, edema, or diffuse swelling.  Palpable pulses, good capillary refill of the digits.  No coolness, discoloration, edema or obvious varicosities.    Skin: no lesions noted.    Lymphatic: no detected adenopathy in the upper or lower extremities.    Neurologic: normal mental status, normal reflexes, normal gait and balance.  Patient is alert and oriented to person, place and time.  No flaccidity or spasticity is noted.  No motor or sensory deficits are noted.  Light touch is intact    Orthopaedic: Elbow Exam - RIGHT    Inspection: Normal skin color and appearance, no ecchymosis, no swelling, no scars.  There is a normal carrying angle.      ROM: 0° - 135+° with 80° supination and 80° pronation.       Palpation: No tenderness at the medial epicondyle, olecranon, or radial head. +tenderness of the lateral epicondyle.      The wrist flexor-pronator muscle group is nontender.    The wrist extensor mobile wad is tender.    Strength: Flexor and extensor motor strength is 5/5.      Stability: Varus and valgus stress reveals no instability. No Guarding    Neuro Reflexes are 2/2 biceps, brachioradialis and triceps. Sensation intact    Test: - Milking  maneuver - VEO - Thinker's - Tinel's Sign - Ulnar nerve subluxation - Hook Test + Pain with resisted wrist ext + Pain with long finger ext      IMAGING    Xrays include 3 Views right elbow are ordered / images reviewed by me:  There is normal bone quality in alignment.  There is no evidence of fractures or dislocations.  There is no evidence of loose bodies.  There is no osteochondral defects.  There is no significant joint space narrowing or gapping.  No calcifications noted along the medial or lateral epicondyles      IMPRESSION       ICD-10-CM ICD-9-CM   1. Partial tear of common extensor tendon of right elbow  S56.511A 841.8   2. Right lateral epicondylitis  M77.11 726.32         MEDICATIONS PRESCRIBED      None    RECOMMENDATIONS     History and physical examination are consistent with recurrent lateral epicondylitis  Surgical and conservative treatment options were discussed  Since he has received and essentially failed 2 previous injections, I think his best long-term option may be surgical intervention  We briefly discussed the risks, benefits, and postoperative recovery of a lateral epicondylar debridement with repair of the common extensor tendons.  He had similar symptoms and ultimately underwent surgery for his left elbow and has done well.  He understands the risk of continued injections and is considering surgery in the near future however, he requested a repeat injection today  Activity modifications were given  Encouraged frequent icing as needed for pain  Right elbow lateral epicondylar CSI was administered under ultrasound guidance today  Return to clinic as needed            Todd Osman M.D.  [email or web address]

## 2024-12-31 ENCOUNTER — ANESTHESIA (OUTPATIENT)
Dept: SURGERY | Facility: HOSPITAL | Age: 49
End: 2024-12-31
Payer: COMMERCIAL

## 2024-12-31 ENCOUNTER — ANESTHESIA EVENT (OUTPATIENT)
Dept: SURGERY | Facility: HOSPITAL | Age: 49
End: 2024-12-31
Payer: COMMERCIAL

## 2024-12-31 ENCOUNTER — HOSPITAL ENCOUNTER (OUTPATIENT)
Facility: HOSPITAL | Age: 49
Discharge: HOME | End: 2024-12-31
Attending: ORTHOPAEDIC SURGERY | Admitting: ORTHOPAEDIC SURGERY
Payer: COMMERCIAL

## 2024-12-31 VITALS
HEART RATE: 76 BPM | RESPIRATION RATE: 17 BRPM | BODY MASS INDEX: 36.48 KG/M2 | HEIGHT: 66 IN | OXYGEN SATURATION: 92 % | DIASTOLIC BLOOD PRESSURE: 62 MMHG | WEIGHT: 227 LBS | SYSTOLIC BLOOD PRESSURE: 110 MMHG | TEMPERATURE: 98 F

## 2024-12-31 DIAGNOSIS — S56.511A PARTIAL TEAR OF COMMON EXTENSOR TENDON OF RIGHT ELBOW: ICD-10-CM

## 2024-12-31 PROCEDURE — 63600175 PHARM REV CODE 636 W HCPCS: Performed by: SURGERY

## 2024-12-31 PROCEDURE — 71000015 HC POSTOP RECOV 1ST HR: Performed by: ORTHOPAEDIC SURGERY

## 2024-12-31 PROCEDURE — 63600175 PHARM REV CODE 636 W HCPCS: Performed by: ANESTHESIOLOGY

## 2024-12-31 PROCEDURE — 27200651 HC AIRWAY, LMA: Performed by: SURGERY

## 2024-12-31 PROCEDURE — 24359 REPAIR ELBOW DEB/ATTCH OPEN: CPT | Mod: RT,,, | Performed by: ORTHOPAEDIC SURGERY

## 2024-12-31 PROCEDURE — 37000009 HC ANESTHESIA EA ADD 15 MINS: Performed by: ORTHOPAEDIC SURGERY

## 2024-12-31 PROCEDURE — 94761 N-INVAS EAR/PLS OXIMETRY MLT: CPT

## 2024-12-31 PROCEDURE — 63600175 PHARM REV CODE 636 W HCPCS: Performed by: NURSE ANESTHETIST, CERTIFIED REGISTERED

## 2024-12-31 PROCEDURE — 99900035 HC TECH TIME PER 15 MIN (STAT)

## 2024-12-31 PROCEDURE — 71000033 HC RECOVERY, INTIAL HOUR: Performed by: ORTHOPAEDIC SURGERY

## 2024-12-31 PROCEDURE — 25000003 PHARM REV CODE 250: Performed by: ORTHOPAEDIC SURGERY

## 2024-12-31 PROCEDURE — 36000708 HC OR TIME LEV III 1ST 15 MIN: Performed by: ORTHOPAEDIC SURGERY

## 2024-12-31 PROCEDURE — 25000003 PHARM REV CODE 250: Performed by: NURSE ANESTHETIST, CERTIFIED REGISTERED

## 2024-12-31 PROCEDURE — 25000003 PHARM REV CODE 250: Performed by: NURSE PRACTITIONER

## 2024-12-31 PROCEDURE — 63600175 PHARM REV CODE 636 W HCPCS: Mod: JZ,JG | Performed by: ORTHOPAEDIC SURGERY

## 2024-12-31 PROCEDURE — 36000709 HC OR TIME LEV III EA ADD 15 MIN: Performed by: ORTHOPAEDIC SURGERY

## 2024-12-31 PROCEDURE — 37000008 HC ANESTHESIA 1ST 15 MINUTES: Performed by: ORTHOPAEDIC SURGERY

## 2024-12-31 RX ORDER — FENTANYL CITRATE 50 UG/ML
INJECTION, SOLUTION INTRAMUSCULAR; INTRAVENOUS
Status: DISCONTINUED | OUTPATIENT
Start: 2024-12-31 | End: 2024-12-31

## 2024-12-31 RX ORDER — LIDOCAINE HYDROCHLORIDE 20 MG/ML
INJECTION INTRAVENOUS
Status: DISCONTINUED | OUTPATIENT
Start: 2024-12-31 | End: 2024-12-31

## 2024-12-31 RX ORDER — ONDANSETRON HYDROCHLORIDE 2 MG/ML
INJECTION, SOLUTION INTRAVENOUS
Status: DISCONTINUED | OUTPATIENT
Start: 2024-12-31 | End: 2024-12-31

## 2024-12-31 RX ORDER — KETAMINE HCL IN 0.9 % NACL 50 MG/5 ML
SYRINGE (ML) INTRAVENOUS
Status: DISCONTINUED | OUTPATIENT
Start: 2024-12-31 | End: 2024-12-31

## 2024-12-31 RX ORDER — HYDROCODONE BITARTRATE AND ACETAMINOPHEN 7.5; 325 MG/1; MG/1
1 TABLET ORAL EVERY 4 HOURS PRN
Qty: 20 TABLET | Refills: 0 | Status: SHIPPED | OUTPATIENT
Start: 2024-12-31

## 2024-12-31 RX ORDER — HALOPERIDOL 5 MG/ML
10 INJECTION INTRAMUSCULAR EVERY 10 MIN PRN
Status: CANCELLED | OUTPATIENT
Start: 2024-12-31

## 2024-12-31 RX ORDER — PROPOFOL 10 MG/ML
VIAL (ML) INTRAVENOUS
Status: DISCONTINUED | OUTPATIENT
Start: 2024-12-31 | End: 2024-12-31

## 2024-12-31 RX ORDER — CEFAZOLIN 2 G/1
2 INJECTION, POWDER, FOR SOLUTION INTRAMUSCULAR; INTRAVENOUS
Status: DISCONTINUED | OUTPATIENT
Start: 2024-12-31 | End: 2024-12-31 | Stop reason: HOSPADM

## 2024-12-31 RX ORDER — FENTANYL CITRATE 50 UG/ML
25 INJECTION, SOLUTION INTRAMUSCULAR; INTRAVENOUS EVERY 5 MIN PRN
Status: COMPLETED | OUTPATIENT
Start: 2024-12-31 | End: 2024-12-31

## 2024-12-31 RX ORDER — BUPIVACAINE HYDROCHLORIDE 5 MG/ML
INJECTION, SOLUTION EPIDURAL; INTRACAUDAL
Status: DISCONTINUED | OUTPATIENT
Start: 2024-12-31 | End: 2024-12-31 | Stop reason: HOSPADM

## 2024-12-31 RX ORDER — HYDROMORPHONE HYDROCHLORIDE 1 MG/ML
0.2 INJECTION, SOLUTION INTRAMUSCULAR; INTRAVENOUS; SUBCUTANEOUS EVERY 5 MIN PRN
Status: DISCONTINUED | OUTPATIENT
Start: 2024-12-31 | End: 2024-12-31 | Stop reason: HOSPADM

## 2024-12-31 RX ORDER — ACETAMINOPHEN 500 MG
1000 TABLET ORAL
Status: COMPLETED | OUTPATIENT
Start: 2024-12-31 | End: 2024-12-31

## 2024-12-31 RX ORDER — DEXAMETHASONE SODIUM PHOSPHATE 4 MG/ML
INJECTION, SOLUTION INTRA-ARTICULAR; INTRALESIONAL; INTRAMUSCULAR; INTRAVENOUS; SOFT TISSUE
Status: DISCONTINUED | OUTPATIENT
Start: 2024-12-31 | End: 2024-12-31

## 2024-12-31 RX ORDER — LABETALOL HYDROCHLORIDE 5 MG/ML
INJECTION, SOLUTION INTRAVENOUS
Status: DISCONTINUED | OUTPATIENT
Start: 2024-12-31 | End: 2024-12-31

## 2024-12-31 RX ORDER — OXYCODONE HYDROCHLORIDE 5 MG/1
5 TABLET ORAL
Status: DISCONTINUED | OUTPATIENT
Start: 2024-12-31 | End: 2024-12-31 | Stop reason: HOSPADM

## 2024-12-31 RX ORDER — GLUCAGON 1 MG
1 KIT INJECTION
Status: DISCONTINUED | OUTPATIENT
Start: 2024-12-31 | End: 2024-12-31 | Stop reason: HOSPADM

## 2024-12-31 RX ORDER — FAMOTIDINE 10 MG/ML
INJECTION INTRAVENOUS
Status: DISCONTINUED | OUTPATIENT
Start: 2024-12-31 | End: 2024-12-31

## 2024-12-31 RX ORDER — PROPOFOL 10 MG/ML
VIAL (ML) INTRAVENOUS CONTINUOUS PRN
Status: DISCONTINUED | OUTPATIENT
Start: 2024-12-31 | End: 2024-12-31

## 2024-12-31 RX ORDER — DEXMEDETOMIDINE HYDROCHLORIDE 100 UG/ML
INJECTION, SOLUTION INTRAVENOUS
Status: DISCONTINUED | OUTPATIENT
Start: 2024-12-31 | End: 2024-12-31

## 2024-12-31 RX ORDER — CHLORPROMAZINE HYDROCHLORIDE 25 MG/1
25 TABLET, FILM COATED ORAL 3 TIMES DAILY PRN
Status: DISCONTINUED | OUTPATIENT
Start: 2024-12-31 | End: 2024-12-31 | Stop reason: HOSPADM

## 2024-12-31 RX ORDER — MIDAZOLAM HYDROCHLORIDE 1 MG/ML
INJECTION INTRAMUSCULAR; INTRAVENOUS
Status: DISCONTINUED | OUTPATIENT
Start: 2024-12-31 | End: 2024-12-31

## 2024-12-31 RX ORDER — SODIUM CHLORIDE 0.9 % (FLUSH) 0.9 %
10 SYRINGE (ML) INJECTION
Status: DISCONTINUED | OUTPATIENT
Start: 2024-12-31 | End: 2024-12-31 | Stop reason: HOSPADM

## 2024-12-31 RX ORDER — ONDANSETRON HYDROCHLORIDE 2 MG/ML
4 INJECTION, SOLUTION INTRAVENOUS DAILY PRN
Status: DISCONTINUED | OUTPATIENT
Start: 2024-12-31 | End: 2024-12-31 | Stop reason: HOSPADM

## 2024-12-31 RX ORDER — MUPIROCIN 20 MG/G
OINTMENT TOPICAL
Status: DISCONTINUED | OUTPATIENT
Start: 2024-12-31 | End: 2024-12-31 | Stop reason: HOSPADM

## 2024-12-31 RX ORDER — IBUPROFEN 200 MG
200 TABLET ORAL EVERY 6 HOURS PRN
COMMUNITY

## 2024-12-31 RX ORDER — CARBOXYMETHYLCELLULOSE SODIUM 10 MG/ML
GEL OPHTHALMIC
Status: DISCONTINUED | OUTPATIENT
Start: 2024-12-31 | End: 2024-12-31

## 2024-12-31 RX ADMIN — FENTANYL CITRATE 100 MCG: 50 INJECTION, SOLUTION INTRAMUSCULAR; INTRAVENOUS at 10:12

## 2024-12-31 RX ADMIN — SODIUM CHLORIDE: 0.9 INJECTION, SOLUTION INTRAVENOUS at 09:12

## 2024-12-31 RX ADMIN — Medication 20 MG: at 10:12

## 2024-12-31 RX ADMIN — ACETAMINOPHEN 1000 MG: 500 TABLET ORAL at 09:12

## 2024-12-31 RX ADMIN — FAMOTIDINE 20 MG: 10 INJECTION, SOLUTION INTRAVENOUS at 10:12

## 2024-12-31 RX ADMIN — FENTANYL CITRATE 25 MCG: 50 INJECTION INTRAMUSCULAR; INTRAVENOUS at 12:12

## 2024-12-31 RX ADMIN — HYDROMORPHONE HYDROCHLORIDE 0.2 MG: 1 INJECTION, SOLUTION INTRAMUSCULAR; INTRAVENOUS; SUBCUTANEOUS at 12:12

## 2024-12-31 RX ADMIN — PROPOFOL 150 MG: 10 INJECTION, EMULSION INTRAVENOUS at 10:12

## 2024-12-31 RX ADMIN — LABETALOL HYDROCHLORIDE 5 MG: 5 INJECTION, SOLUTION INTRAVENOUS at 11:12

## 2024-12-31 RX ADMIN — LIDOCAINE HYDROCHLORIDE 100 MG: 20 INJECTION INTRAVENOUS at 10:12

## 2024-12-31 RX ADMIN — MUPIROCIN: 20 OINTMENT TOPICAL at 09:12

## 2024-12-31 RX ADMIN — DEXAMETHASONE SODIUM PHOSPHATE 8 MG: 4 INJECTION, SOLUTION INTRAMUSCULAR; INTRAVENOUS at 10:12

## 2024-12-31 RX ADMIN — CARBOXYMETHYLCELLULOSE SODIUM 4 DROP: 10 GEL OPHTHALMIC at 10:12

## 2024-12-31 RX ADMIN — PROPOFOL 20 MG: 10 INJECTION, EMULSION INTRAVENOUS at 11:12

## 2024-12-31 RX ADMIN — PROPOFOL 150 MCG/KG/MIN: 10 INJECTION, EMULSION INTRAVENOUS at 10:12

## 2024-12-31 RX ADMIN — DEXMEDETOMIDINE 8 MCG: 100 INJECTION, SOLUTION, CONCENTRATE INTRAVENOUS at 11:12

## 2024-12-31 RX ADMIN — SODIUM CHLORIDE, SODIUM GLUCONATE, SODIUM ACETATE, POTASSIUM CHLORIDE, MAGNESIUM CHLORIDE, SODIUM PHOSPHATE, DIBASIC, AND POTASSIUM PHOSPHATE: .53; .5; .37; .037; .03; .012; .00082 INJECTION, SOLUTION INTRAVENOUS at 10:12

## 2024-12-31 RX ADMIN — MIDAZOLAM HYDROCHLORIDE 2 MG: 2 INJECTION, SOLUTION INTRAMUSCULAR; INTRAVENOUS at 10:12

## 2024-12-31 RX ADMIN — ONDANSETRON 4 MG: 2 INJECTION INTRAMUSCULAR; INTRAVENOUS at 10:12

## 2024-12-31 NOTE — BRIEF OP NOTE
[facility]  Brief Operative Note    Surgery Date: 12/31/2024     Surgeon(s) and Role:     * Todd Rayo MD - Primary      Pre-Operative Diagnosis: Partial tear of common extensor tendon of right elbow [S56.511A]  Right lateral epicondylitis [M77.11]    Post-Operative Diagnosis: Post-Op Diagnosis Codes:     * Partial tear of common extensor tendon of right elbow [S56.511A]     * Right lateral epicondylitis [M77.11]    Procedure(s) (LRB):  TENOTOMY,ELBOW,WITH DEBRIDEMENT AND TENDON REPAIR (Right)    Anesthesia: General    Operative Findings: See Op note.    Estimated Blood Loss: * No values recorded between 12/31/2024 10:54 AM and 12/31/2024 11:45 AM *         Specimens:   Specimen (24h ago, onward)      None              Discharge Note    OUTCOME: Patient tolerated treatment/procedure well without complication and is now ready for discharge.    DISPOSITION: Home or Self Care    FINAL DIAGNOSIS:  Post-Op Diagnosis Codes:     * Partial tear of common extensor tendon of right elbow [S56.511A]     * Right lateral epicondylitis [M77.11]    FOLLOWUP: In clinic    DISCHARGE INSTRUCTIONS: See attached.

## 2024-12-31 NOTE — OP NOTE
[facility] [place])  Surgery Department  Operative Note    SUMMARY     Date of Procedure: 12/31/2024     Pre-Operative Diagnosis: Partial tear of common extensor tendon of right elbow [S56.511A]  Right lateral epicondylitis [M77.11]    Post-Operative Diagnosis: Post-Op Diagnosis Codes:     * Partial tear of common extensor tendon of right elbow [S56.511A]     * Right lateral epicondylitis [M77.11]    Procedure: Procedure(s) (LRB):  TENOTOMY,ELBOW,WITH DEBRIDEMENT AND TENDON REPAIR (Right)     Surgeons and Role:     * Todd Rayo MD - Primary    Assistant: Hood Carson MD - Fellow    Anesthesia: General    Complications:  None    Implants: * No implants in log *    Indication for Procedure: 49 y.o. Male with a history of right elbow pain who I have previously treated for his back.  He complains today of having recurrent lateral-sided elbow pain over the last 6 weeks.  He 1st began having pain approximately 2 years ago and was seen by another doctor.  He has received 2 injections into the lateral elbow.  The 1st injection lasted approximately 1 year.  The 2nd injection was given 6 months ago and gave him good relief for the 1st few months.  His physical exam and imaging were consistent with a high-grade partial-thickness tear and chronic tendinosis of the common extensor tendons.  We discussed the risks, benefits and alternatives of surgery.  He elected to proceed with surgical intervention.    Surgery in Detail: The patient was marked and identified in the holding room.  He was brought back to the operating room and placed in the supine position.  Preoperative antibiotics were given within 1 hour the procedure.  Hewas prepped and draped in usual sterile fashion.  A tourniquet was placed on the right upper extremity.  After a time-out was taken an incision was made directly over the lateral epicondyle.  Incision was carried down through the subcutaneous tissue to the common extensor tendon  origin.  The common extensor tendons were then split and the diseased tissue of the ECRB was identified deep to the split.  This was debrided both with sharp dissection as well as a rongeur.  We lightly decorticated the origin of the common extensor tendon to create a bleeding bed. A FiberTak double loaded suture anchor was placed .  Sutures were then used to repair the common extensor tendon origin back down to the lateral epicondyle.  We then closed the split with 0 Vicryl sutures. The subcutaneous layer was closed with 2-0 Vicryl sutures. The tourniquet was let down and hemostasis was achieved.  We used 0.25% Marcaine and epinephrine for local anesthetic.  The skin was closed with a 3-0 Monocryl in a subcuticular fashion.  Steri-Strips were applied as well as Adaptic with bacitracin 4x4s Webril and a well-padded posterior splint.  The patient was then extubated and taken recovery in satisfactory condition.    Post-Op Plan:  Lateral epicondylitis debridement protocol    Attestation: I was present and scrubbed for the entire procedure.

## 2024-12-31 NOTE — PLAN OF CARE
VSS.  Patient tolerating oral liquids without difficulty.   No apparent s&s of distress noted at this time, no complaints voiced at this time.   Discharge instructions reviewed with patient and spouse with good verbal feedback received.   Post op medications delivered to bedside, DME arm sling on.  Patient ready for discharge.

## 2024-12-31 NOTE — DISCHARGE INSTRUCTIONS
POST-OPERATIVE DISCHARGE INSTRUCTIONS    Diet: Ice chips, clear liquids, and then diet as tolerated.  Drink plenty of liquids after surgery.  Ice the area at least three times a day (20 minutes per session) if possible.    Elevate the extremity above the level of the heart to help reduce swelling.  Remain non-weightbearing until further notice.  Pain medication can be taken every four to six hours as needed.  It is helpful to take pain medication prior to physical therapy. If pain is not controlled, please take 800 mg ibuprofen every 8 hours as needed unless contraindicated (NSAID allergy, kidney disease, heart disease, currently taking blood thinners, etc.).  Any activity that requires precise thinking or accuracy should be avoided for a minimum of 72 hours after surgery and while on narcotic pain medication.  This includes operating machinery and/or driving a vehicle.  All sutures will be removed approximately 10-14 days from the time of surgery. Leave steri-strips (skin tapes) in place until sutures are removed.  If skin glue is used instead of stitches, do not apply any ointments or solutions to the incision.  Keep the incision dry.  The skin glue will peel off in 3-4 weeks.  Dressing change directions, unless otherwise instructed:     ?  Open surgery (Lateral Epicondylitis):  Keep splint intact until first post operative appointment.    All casts, splints, braces, slings, crutches, abduction pillows, etc. are to be worn as instructed.  Luis Hose or Sequential Compression Devices are often used following surgery to help with swelling and prevent blood clots.  They can be removed for 2-3 hours daily as needed.  If the hose become uncomfortable after a few days, switch to an Ace Wrap if desired.  Keep all incisions clean and dry for 10-14 days.  A waterproof dressing ([employer] or [facility]) can be used to shower if not in a splint.  No bath, pool, or hot tub until instructed.  You should notify our office if you notice  any of the following:  A temperature greater than 101 F  Severe increased pain  Excessive drainage or redness of the incision  Calf swelling and pain  Chest pain  Your post-op follow up appointment will be approximately 14 days from the time of surgery.  If you do not have an appointment scheduled, please call our office and schedule within 1-2 days.   If you have any problems or questions, please call our office at [phone].

## 2024-12-31 NOTE — ANESTHESIA PREPROCEDURE EVALUATION
12/31/2024  Anderson Bell is a 49 y.o., male.    Procedure: TENOTOMY,ELBOW,WITH DEBRIDEMENT AND TENDON REPAIR (Right: Elbow) - NO REGIONAL BLOCK   Anesthesia type: General   Diagnosis:      Partial tear of common extensor tendon of right elbow [S56.511A]      Right lateral epicondylitis [M77.11]       Pre-op Assessment    I have reviewed the Patient Summary Reports.     I have reviewed the Nursing Notes. I have reviewed the NPO Status.   I have reviewed the Medications.     Review of Systems  Anesthesia Hx:  No problems with previous Anesthesia   History of prior surgery of interest to airway management or planning:  Previous anesthesia: MAC, Nerve Block      for 10/27/2020  Right Carpal Tunnel Release.   8/15/2024  Left Elbow Debridement of Tennis Elbow, Tenosynovectomy with MAC.  Procedure performed at an [facility].  Denies Family Hx of Anesthesia complications.    Denies Personal Hx of Anesthesia complications.                    Social:  No Alcohol Use, Non-Smoker       Hematology/Oncology:  Hematology Normal   Oncology Normal                                   EENT/Dental:  EENT/Dental Normal           Cardiovascular:  Cardiovascular Normal Exercise tolerance: good       Denies CAD.       Denies Angina.       Denies CROOK.    Patient not on beta blockers                          Pulmonary:  Pulmonary Normal    Denies Asthma.   Denies Shortness of breath.                  Renal/:  Renal/ Normal                 Hepatic/GI:      Denies GERD. Liver Disease,  Echogenic liver which may reflect fatty infiltration.  Small hepatic and left renal cysts.    Not Taking GLP-1 Agonists            Musculoskeletal:     Partial tear of common extensor tendon of right elbow,  Right lateral epicondylitis,  H/O Pain in left hand, Finger stiffness, left,  S/P Left Elbow Debridement,  Tenosynovectomy,  Bilateral Knee Arthroscopies              Neurological:    Denies CVA.    Denies Headaches. Denies Seizures.    H/O Carpal Tunnel Release, right                            Endocrine:  Denies Diabetes. Denies Hypothyroidism.          Psych:  Psychiatric Normal                  Physical Exam    Airway:  Mallampati: II / II  Mouth Opening: Normal  TM Distance: Normal  Tongue: Normal  Neck ROM: Normal ROM    Dental:  Intact  No past medical history on file.  Past Surgical History:   Procedure Laterality Date    ARTHROSCOPY OF BOTH KNEES      CARPAL TUNNEL RELEASE Right     DEBRIDEMENT TENNIS ELBOW Left     TENOSYNOVECTOMY Left 8/15/2024    Procedure: TENOSYNOVECTOMY, FLEXOR RING;  Surgeon: Sophy Melissa MD;  Location: [facility];  Service: Orthopedics;  Laterality: Left;       Anesthesia Assessment: Preoperative EQUATION    Planned Procedure: Procedure(s) (LRB):  TENOTOMY,ELBOW,WITH DEBRIDEMENT AND TENDON REPAIR (Right)  Requested Anesthesia Type:General  Surgeon: Todd Rayo MD  Service: Orthopedics  Known or anticipated Date of Surgery:12/31/2024    Surgeon notes: reviewed    Electronic QUestionnaire Assessment completed via nurse interview with patient.        Triage considerations:     The patient has no apparent active cardiac condition (No unstable coronary Syndrome such as severe unstable angina or recent [<1 month] myocardial infarction, decompensated CHF, severe valvular   disease or significant arrhythmia)    Previous anesthesia records:MAC, Nerve block for post-op pain, and No problems    Last P[facility] 3[facility]6 months [facility]   Subspecialty notes: Ortho    Other important co-morbidities: Obesity      Tests already available:  Results have been reviewed.             Instructions given. (See in Nurse's note) Preop medication instructions sent via portal message.     Optimization:  Anesthesia Preop Clinic Assessment Not Indicated    Medical Opinion Indicated: No        Sub-specialist consult indicated: No      Plan: Consultation: medical clearance is not requested.              Patient is add-on to surgery schedule.     Navigation:  Straight Line to surgery.               No tests, anesthesia preop clinic visit, or consult required.                        Patient is OK to proceed with surgery at [facility].       Ht: 5'6  Wt: 102.5 kg (226 lb)  BMI: 36.5    Anesthesia Plan  Type of Anesthesia, risks & benefits discussed:    Anesthesia Type: Gen ETT  Intra-op Monitoring Plan: Standard ASA Monitors  Post Op Pain Control Plan: multimodal analgesia and IV/PO Opioids PRN  Induction:  IV  Airway Plan: Direct  Informed Consent: Informed consent signed with the Patient and all parties understand the risks and agree with anesthesia plan.  All questions answered.   ASA Score: 2    Ready For Surgery From Anesthesia Perspective.   .

## 2024-12-31 NOTE — TRANSFER OF CARE
"Anesthesia Transfer of Care Note    Patient: Anderson Bell    Procedure(s) Performed: Procedure(s) (LRB):  TENOTOMY,ELBOW,WITH DEBRIDEMENT AND TENDON REPAIR (Right)    Patient location: PACU    Anesthesia Type: general    Transport from OR: Transported from OR on 6-10 L/min O2 by face mask with adequate spontaneous ventilation    Post pain: adequate analgesia    Post assessment: no apparent anesthetic complications and tolerated procedure well    Post vital signs: stable    Level of consciousness: sedated    Nausea/Vomiting: no nausea/vomiting    Complications: none    Transfer of care protocol was followed      Last vitals: Visit Vitals  /89 (BP Location: Left arm, Patient Position: Lying)   Pulse 90   Temp 36.7 °C (98.1 °F) (Temporal)   Resp 16   Ht 5' 6" (1.676 m)   Wt 103 kg (227 lb)   SpO2 96%   BMI 36.64 kg/m²     "

## 2024-12-31 NOTE — ANESTHESIA PROCEDURE NOTES
Intubation    Date/Time: 12/31/2024 10:42 AM    Performed by: Sophy Hernandez CRNA  Authorized by: Tanvir Lyon MD    Intubation:     Induction:  Intravenous    Intubated:  Postinduction    Mask Ventilation:  Not attempted    Attempts:  1    Attempted By:  CRNA    Difficult Airway Encountered?: No      Complications:  None    Airway Device:  Supraglottic airway/LMA    Airway Device Size:  5.0    Style/Cuff Inflation:  Cuffed    Inflation Amount (mL):  0    Secured at:  The lips    Placement Verified By:  Capnometry    Complicating Factors:  Obesity and small mouth    Findings Post-Intubation:  BS equal bilateral and atraumatic/condition of teeth unchanged

## 2025-01-02 NOTE — ANESTHESIA POSTPROCEDURE EVALUATION
Anesthesia Post Evaluation    Patient: Anderson Bell    Procedure(s) Performed: Procedure(s) (LRB):  TENOTOMY,ELBOW,WITH DEBRIDEMENT AND TENDON REPAIR (Right)    Final Anesthesia Type: general      Patient location during evaluation: PACU  Patient participation: Yes- Able to Participate  Level of consciousness: awake and alert and oriented  Post-procedure vital signs: reviewed and stable  Pain management: adequate  Airway patency: patent  JIM mitigation strategies: Multimodal analgesia  PONV status at discharge: No PONV  Anesthetic complications: no      Cardiovascular status: blood pressure returned to baseline and hemodynamically stable  Respiratory status: unassisted, spontaneous ventilation and room air  Hydration status: euvolemic  Follow-up not needed.              Vitals Value Taken Time   /62 12/31/24 1230   Temp  01/02/25 0629   Pulse 87 12/31/24 1309   Resp 18 12/31/24 1309   SpO2 95 % 12/31/24 1302   Vitals shown include unfiled device data.      Event Time   Out of Recovery 12:50:00         Pain/Harvinder Score: No data recorded

## 2025-01-03 ENCOUNTER — PATIENT MESSAGE (OUTPATIENT)
Dept: SPORTS MEDICINE | Facility: CLINIC | Age: 50
End: 2025-01-03
Payer: COMMERCIAL

## 2025-01-10 ENCOUNTER — DOCUMENTATION ONLY (OUTPATIENT)
Dept: REHABILITATION | Facility: HOSPITAL | Age: 50
End: 2025-01-10
Payer: COMMERCIAL

## 2025-01-10 DIAGNOSIS — M25.642 FINGER STIFFNESS, LEFT: ICD-10-CM

## 2025-01-10 DIAGNOSIS — M79.642 PAIN IN LEFT HAND: Primary | ICD-10-CM

## 2025-01-10 NOTE — PROGRESS NOTES
[facility]  Occupational Therapy Discharge Note    [id number]    Therapy Diagnosis:   Encounter Diagnoses   Name Primary?    Pain in left hand Yes    Finger stiffness, left      Physician: Sophy Melissa MD     Physician Orders: Eval and Treat  Medical Diagnosis: M65.842 (ICD-10-CM) - Other synovitis and tenosynovitis, left hand  Surgical Procedure and Date:  8/15/2024 Flexor tenosynovectomy left ring finger,   Evaluation Date: 8/28/2024      Date of Last visit: 11/14/24  Total Visits Received: 5    ASSESSMENT      Anderson with return of full function of left hand with no pain, discomfort or limitations during functional tasks.     Discharge reason: met full function of left hand    Discharge FOTO Score: not acquired    Goals: Short Term Goals: (in 4-6 weeks)(10/9/24)  1) Patient will be independent in HEP  2) Decrease pain in L hand to no more than 4/10 worst in ADL/instrumental activities of daily living's- met 11/5/24  3) Increase AROM in Ring finger PIP extension for improved functioning in ADL/instrumental activities of daily living's- met  11/5/24  Assess  strength when appropriate-met 11/5/24 left involved exceeded right dominant hand        Long Term Goals: (in 6-8 weeks) (10/23/24)  1) Decrease pain in L hand to no more than 2/10 worst in ADL/instrumental activities of daily living's-met 11/5/24  2) Increase AROM of L Ring finger to WFL for increased functioning in ADL/instrumental activities of daily living's- met 11/5/24  3) Increase strength in L hand to 60 lbs for improved functioning in ADL/instrumental activities of daily living's- met 11/5/24  4) Increased functioning in ADL/IADL's, as evidenced by a FOTO impairment rating of 70- exceeded goal  5) Decrease edema in L hand to trace or none - met    PLAN   This patient is discharged from Occupational Therapy      ALAN Serrano

## 2025-01-15 ENCOUNTER — OFFICE VISIT (OUTPATIENT)
Dept: SPORTS MEDICINE | Facility: CLINIC | Age: 50
End: 2025-01-15
Payer: COMMERCIAL

## 2025-01-15 VITALS
SYSTOLIC BLOOD PRESSURE: 114 MMHG | HEIGHT: 66 IN | BODY MASS INDEX: 37.21 KG/M2 | DIASTOLIC BLOOD PRESSURE: 77 MMHG | WEIGHT: 231.5 LBS

## 2025-01-15 DIAGNOSIS — Z98.890 S/P TENDON REPAIR: Primary | ICD-10-CM

## 2025-01-15 DIAGNOSIS — S56.511A PARTIAL TEAR OF COMMON EXTENSOR TENDON OF RIGHT ELBOW: ICD-10-CM

## 2025-01-15 PROCEDURE — 1159F MED LIST DOCD IN RCRD: CPT | Mod: CPTII,S$GLB,, | Performed by: ORTHOPAEDIC SURGERY

## 2025-01-15 PROCEDURE — 3074F SYST BP LT 130 MM HG: CPT | Mod: CPTII,S$GLB,, | Performed by: ORTHOPAEDIC SURGERY

## 2025-01-15 PROCEDURE — 99024 POSTOP FOLLOW-UP VISIT: CPT | Mod: S$GLB,,, | Performed by: ORTHOPAEDIC SURGERY

## 2025-01-15 PROCEDURE — 99999 PR PBB SHADOW E&M-EST. PATIENT-LVL III: CPT | Mod: PBBFAC,,, | Performed by: ORTHOPAEDIC SURGERY

## 2025-01-15 PROCEDURE — 3078F DIAST BP <80 MM HG: CPT | Mod: CPTII,S$GLB,, | Performed by: ORTHOPAEDIC SURGERY

## 2025-01-15 NOTE — PROGRESS NOTES
POST-OPERATIVE EXAMINATION    49 y.o. Male who returns for follow after surgery. He is 2 weeks s/p       Procedure: Procedure(s) (LRB):  TENOTOMY,ELBOW,WITH DEBRIDEMENT AND TENDON REPAIR (Right)       He is doing well without any issues.       PHYSICAL EXAMINATION:  There were no vitals taken for this visit.  General: Well-developed well-nourished 49 y.o. male in no acute distress   Cardiovascular: Regular rhythm   Lungs: No labored breathing or wheezing appreciated   Neuro: Alert and oriented ×3   Psychiatric: well oriented to person, place and time, demonstrates normal mood and affect   Skin: No rashes, lesions or ulcers, normal temperature, turgor, and texture on involved extremity    ORTHOPEDIC EXAM:  Normal post-operative swelling  Normal post-operative scarring  Strength: Grossly intact  ROM: as expected  Tests: none today     ASSESSMENT:      ICD-10-CM ICD-9-CM   1. S/P tendon repair  Z98.890 V45.89   2. Partial tear of common extensor tendon of right elbow  S56.511A 841.8         PLAN:       Sutures removed today  Continue physical therapy  Wrist splint given to patient today   RTC in 1 month with Glen Patel PA-C

## 2025-01-24 ENCOUNTER — OFFICE VISIT (OUTPATIENT)
Dept: ORTHOPEDICS | Facility: CLINIC | Age: 50
End: 2025-01-24
Payer: COMMERCIAL

## 2025-01-24 DIAGNOSIS — M15.2 OSTEOARTHRITIS OF PROXIMAL INTERPHALANGEAL (PIP) JOINT OF LEFT RING FINGER: ICD-10-CM

## 2025-01-24 DIAGNOSIS — M65.842 STENOSING TENOSYNOVITIS OF FINGER OF LEFT HAND: Primary | ICD-10-CM

## 2025-01-24 DIAGNOSIS — M79.642 CHRONIC PAIN OF LEFT HAND: ICD-10-CM

## 2025-01-24 DIAGNOSIS — G89.29 CHRONIC PAIN OF LEFT HAND: ICD-10-CM

## 2025-01-24 NOTE — PROGRESS NOTES
Audio Only Telehealth Visit     The patient location is: [place]  The chief complaint leading to consultation is: Left ring PIP osteoarthritis  Visit type: Virtual visit with audio only (telephone)  Total time spent in medical discussion with patient: 5 minutes  Total time spent on date of the encounter: 5 minutes       The reason for the audio only service rather than synchronous audio and video virtual visit was related to technical difficulties or patient preference/necessity.       Each patient to whom I provide medical services by telemedicine is:  (1) informed of the relationship between the physician and patient and the respective role of any other health care provider with respect to management of the patient; and (2) notified that they may decline to receive medical services by telemedicine and may withdraw from such care at any time. Patient verbally consented to receive this service via voice-only telephone call.       HPI: Patient states that he got significant improvement from the steroid injection in the left ring PIP joint. He states that the motion in the finger is full. He does get some aching occasionally but it is much improved. He states that he still has some scar tissue from his procedure which is improving.     Assessment and plan:  The patient and I had a thorough discussion today. We discussed the working diagnosis as well as several other potential alternative diagnoses. Treatment options were discussed, both conservative and surgical. Conservative treatment options would include things such as activity modifications, workplace modifications, a period of rest, oral vs topical OTC and prescription anti-inflammatory medications, occupational therapy, splinting/bracing, immobilization, corticosteroid injections, and others. Surgical options were discussed as well. Discussed time to healing with scar tissue.    Follow up as needed  Call with any questions/concerns in the interim                             This service was not originating from a related E/M service provided within the previous 7 days nor will  to an E/M service or procedure within the next 24 hours or my soonest available appointment.  Prevailing standard of care was able to be met in this audio-only visit.

## 2025-01-27 ENCOUNTER — PATIENT MESSAGE (OUTPATIENT)
Dept: SPORTS MEDICINE | Facility: CLINIC | Age: 50
End: 2025-01-27
Payer: COMMERCIAL

## 2025-01-27 DIAGNOSIS — Z98.890 S/P TENDON REPAIR: Primary | ICD-10-CM

## 2025-01-27 DIAGNOSIS — M77.11 RIGHT LATERAL EPICONDYLITIS: ICD-10-CM

## 2025-01-29 ENCOUNTER — TELEPHONE (OUTPATIENT)
Dept: SPORTS MEDICINE | Facility: CLINIC | Age: 50
End: 2025-01-29
Payer: COMMERCIAL

## 2025-01-29 NOTE — TELEPHONE ENCOUNTER
Op note Faxed to epic PT     ----- Message from Jasmin sent at 1/29/2025  9:06 AM CST -----  Regarding: Results  Contact: Laurita @[phone] opt 1  Laurita almeida/ [facility] is requesting operative report be faxed to [phone], pt is scheduled for consult today 1/29 @ 230, please call Laurita @[phone] opt 1 if needed

## 2025-02-12 ENCOUNTER — OFFICE VISIT (OUTPATIENT)
Dept: SPORTS MEDICINE | Facility: CLINIC | Age: 50
End: 2025-02-12
Payer: COMMERCIAL

## 2025-02-12 VITALS
WEIGHT: 232.25 LBS | HEIGHT: 66 IN | HEART RATE: 96 BPM | BODY MASS INDEX: 37.33 KG/M2 | SYSTOLIC BLOOD PRESSURE: 112 MMHG | DIASTOLIC BLOOD PRESSURE: 71 MMHG

## 2025-02-12 DIAGNOSIS — Z98.890 S/P TENDON REPAIR: Primary | ICD-10-CM

## 2025-02-12 PROCEDURE — 99999 PR PBB SHADOW E&M-EST. PATIENT-LVL III: CPT | Mod: PBBFAC,,, | Performed by: PHYSICIAN ASSISTANT

## 2025-02-12 NOTE — PROGRESS NOTES
"POST-OPERATIVE EXAMINATION    49 y.o. Male who returns for follow after surgery. He is 6 weeks s/p       Procedure: Procedure(s) (LRB):  TENOTOMY,ELBOW,WITH DEBRIDEMENT AND TENDON REPAIR (Right)       He is doing well without any issues.       PHYSICAL EXAMINATION:  /71 (BP Location: Right arm, Patient Position: Sitting)   Pulse 96   Ht 5' 6" (1.676 m)   Wt 105.3 kg (232 lb 4.1 oz)   BMI 37.49 kg/m²   General: Well-developed well-nourished 49 y.o. male in no acute distress   Cardiovascular: Regular rhythm   Lungs: No labored breathing or wheezing appreciated   Neuro: Alert and oriented ×3   Psychiatric: well oriented to person, place and time, demonstrates normal mood and affect   Skin: No rashes, lesions or ulcers, normal temperature, turgor, and texture on involved extremity    ORTHOPEDIC EXAM:  Normal post-operative swelling  Normal post-operative scarring  Strength: Grossly intact  ROM: as expected  Tests: none today     ASSESSMENT:      ICD-10-CM ICD-9-CM   1. S/P tendon repair  Z98.890 V45.89           PLAN:       Continue physical therapy  Wean from wrist splint  Slowly progress physical activities as tolerated  RTC as needed              Glen Patel PA-C, [facility]          "

## (undated) DEVICE — GLOVE BIOGEL PI MICRO SZ 7

## (undated) DEVICE — GLOVE BIOGEL SKINSENSE PI 7.5

## (undated) DEVICE — NDL SURGEON MAYO #4

## (undated) DEVICE — APPLICATOR CHLORAPREP ORN 26ML

## (undated) DEVICE — DRAPE T EXTRM SURG 121X128X90

## (undated) DEVICE — TOWEL OR XRAY BLUE 17X26IN

## (undated) DEVICE — SUT 2-0 VICRYL / SH (J417)

## (undated) DEVICE — Device

## (undated) DEVICE — TOURNIQUET SB QC DP 18X4IN

## (undated) DEVICE — BANDAGE ESMARK ELASTIC ST 4X9

## (undated) DEVICE — SPONGE COTTON TRAY 4X4IN

## (undated) DEVICE — CORD FOR BIPOLAR FORCEPS 12

## (undated) DEVICE — SPLINT PLASTER FAST SET 5X30IN

## (undated) DEVICE — SLING ARM LARGE FOAM STRAP

## (undated) DEVICE — DRESSING N ADH OIL EMUL 3X3

## (undated) DEVICE — DRAPE STERI U-SHAPED 47X51IN

## (undated) DEVICE — TRAY MINOR ORTHO OMC

## (undated) DEVICE — GLOVE SENSICARE PI ALOE 8

## (undated) DEVICE — UNDERGLOVES BIOGEL PI SZ 7 LF

## (undated) DEVICE — ELECTRODE REM PLYHSV RETURN 9

## (undated) DEVICE — SUT MONOCRYL PLUS UD 3-0 27

## (undated) DEVICE — PAD CAST SPECIALIST STRL 4

## (undated) DEVICE — GOWN ECLIPSE REINF LVL4 TWL XL

## (undated) DEVICE — BLADE SURG #15 CARBON STEEL

## (undated) DEVICE — TOWEL OR DISP STRL BLUE 4/PK

## (undated) DEVICE — CLOSURE SKIN STERI STRIP 1/2X4

## (undated) DEVICE — GLOVE BIOGEL PI MICRO INDIC 7

## (undated) DEVICE — GLOVE PROTEXIS LTX MICRO 8

## (undated) DEVICE — UNDERGLOVES BIOGEL PI SIZE 8

## (undated) DEVICE — PENCIL ROCKER SWITCH 10FT CORD

## (undated) DEVICE — SYR 30CC LUER LOCK

## (undated) DEVICE — GLOVE PROTEXIS LIGHT BROWN 8.5

## (undated) DEVICE — BNDG COFLEX FOAM LF2 ST 3X5YD

## (undated) DEVICE — GOWN ECLIPSE REINF L4 XLNG XXL

## (undated) DEVICE — NDL HYPO STD REG BVL 18GX1.5IN

## (undated) DEVICE — NDL MAGELLAN HYPO BLUE 23G 1IN

## (undated) DEVICE — PAD CAST SPECIALIST STRL 3

## (undated) DEVICE — COVER LIGHT HANDLE 80/CA

## (undated) DEVICE — COVER CAMERA OPERATING ROOM

## (undated) DEVICE — GLOVE BIOGEL SKINSENSE PI 7.0

## (undated) DEVICE — SUT VICRYL 0 27 CT-2

## (undated) DEVICE — SUT ETHILON 3/0 18IN PS-1

## (undated) DEVICE — NDL ECLIPSE SAFETY 23G 1.5IN

## (undated) DEVICE — BANDAGE MATRIX HK LOOP 4IN 5YD

## (undated) DEVICE — SUT PROLENE 3-0 FS-2 18

## (undated) DEVICE — STOCKINETTE TUBULAR 2PL 6 X 4